# Patient Record
Sex: FEMALE | Race: BLACK OR AFRICAN AMERICAN | NOT HISPANIC OR LATINO
[De-identification: names, ages, dates, MRNs, and addresses within clinical notes are randomized per-mention and may not be internally consistent; named-entity substitution may affect disease eponyms.]

---

## 2017-05-02 ENCOUNTER — MEDICATION RENEWAL (OUTPATIENT)
Age: 72
End: 2017-05-02

## 2017-05-08 ENCOUNTER — MEDICATION RENEWAL (OUTPATIENT)
Age: 72
End: 2017-05-08

## 2017-05-24 ENCOUNTER — APPOINTMENT (OUTPATIENT)
Dept: INTERNAL MEDICINE | Facility: CLINIC | Age: 72
End: 2017-05-24

## 2017-05-24 ENCOUNTER — NON-APPOINTMENT (OUTPATIENT)
Age: 72
End: 2017-05-24

## 2017-05-24 ENCOUNTER — LABORATORY RESULT (OUTPATIENT)
Age: 72
End: 2017-05-24

## 2017-05-24 VITALS
BODY MASS INDEX: 35.97 KG/M2 | HEART RATE: 78 BPM | DIASTOLIC BLOOD PRESSURE: 74 MMHG | OXYGEN SATURATION: 98 % | HEIGHT: 63 IN | WEIGHT: 203 LBS | SYSTOLIC BLOOD PRESSURE: 130 MMHG

## 2017-05-24 DIAGNOSIS — Z98.890 OTHER SPECIFIED POSTPROCEDURAL STATES: ICD-10-CM

## 2017-05-24 DIAGNOSIS — Z12.4 ENCOUNTER FOR SCREENING FOR MALIGNANT NEOPLASM OF CERVIX: ICD-10-CM

## 2017-05-24 DIAGNOSIS — Z87.891 PERSONAL HISTORY OF NICOTINE DEPENDENCE: ICD-10-CM

## 2017-05-24 DIAGNOSIS — Z92.89 PERSONAL HISTORY OF OTHER MEDICAL TREATMENT: ICD-10-CM

## 2017-06-01 LAB
25(OH)D3 SERPL-MCNC: 35.8 NG/ML
ALBUMIN SERPL ELPH-MCNC: 4 G/DL
ALP BLD-CCNC: 66 U/L
ALT SERPL-CCNC: 13 U/L
ANION GAP SERPL CALC-SCNC: 15 MMOL/L
APPEARANCE: CLEAR
AST SERPL-CCNC: 23 U/L
BASOPHILS # BLD AUTO: 0.02 K/UL
BASOPHILS NFR BLD AUTO: 0.6 %
BILIRUB SERPL-MCNC: 0.4 MG/DL
BILIRUBIN URINE: NEGATIVE
BLOOD URINE: NEGATIVE
BUN SERPL-MCNC: 18 MG/DL
CALCIUM SERPL-MCNC: 10 MG/DL
CHLORIDE SERPL-SCNC: 102 MMOL/L
CHOLEST SERPL-MCNC: 227 MG/DL
CHOLEST/HDLC SERPL: 2.7 RATIO
CO2 SERPL-SCNC: 24 MMOL/L
COLOR: YELLOW
CREAT SERPL-MCNC: 0.84 MG/DL
CREAT SPEC-SCNC: 78 MG/DL
EOSINOPHIL # BLD AUTO: 0.1 K/UL
EOSINOPHIL NFR BLD AUTO: 2.9 %
GLUCOSE QUALITATIVE U: NORMAL MG/DL
GLUCOSE SERPL-MCNC: 103 MG/DL
HBA1C MFR BLD HPLC: 6.1 %
HCT VFR BLD CALC: 37.2 %
HCV AB SER QL: NONREACTIVE
HCV S/CO RATIO: 0.09 S/CO
HDLC SERPL-MCNC: 85 MG/DL
HGB BLD-MCNC: 11.8 G/DL
IMM GRANULOCYTES NFR BLD AUTO: 0 %
KETONES URINE: NEGATIVE
LDLC SERPL CALC-MCNC: 130 MG/DL
LEUKOCYTE ESTERASE URINE: ABNORMAL
LYMPHOCYTES # BLD AUTO: 0.86 K/UL
LYMPHOCYTES NFR BLD AUTO: 24.6 %
MAN DIFF?: NORMAL
MCHC RBC-ENTMCNC: 31.6 PG
MCHC RBC-ENTMCNC: 31.7 GM/DL
MCV RBC AUTO: 99.5 FL
MICROALBUMIN 24H UR DL<=1MG/L-MCNC: 1.1 MG/DL
MICROALBUMIN/CREAT 24H UR-RTO: 14
MONOCYTES # BLD AUTO: 0.24 K/UL
MONOCYTES NFR BLD AUTO: 6.9 %
NEUTROPHILS # BLD AUTO: 2.27 K/UL
NEUTROPHILS NFR BLD AUTO: 65 %
NITRITE URINE: NEGATIVE
PH URINE: 6
PLATELET # BLD AUTO: 260 K/UL
POTASSIUM SERPL-SCNC: 3.8 MMOL/L
PROT SERPL-MCNC: 7.1 G/DL
PROTEIN URINE: NEGATIVE MG/DL
RBC # BLD: 3.74 M/UL
RBC # FLD: 13.5 %
SODIUM SERPL-SCNC: 141 MMOL/L
SPECIFIC GRAVITY URINE: 1.02
T4 FREE SERPL-MCNC: 1.2 NG/DL
TRIGL SERPL-MCNC: 61 MG/DL
TSH SERPL-ACNC: 1.51 UIU/ML
UROBILINOGEN URINE: NORMAL MG/DL
WBC # FLD AUTO: 3.49 K/UL

## 2017-10-02 ENCOUNTER — MEDICATION RENEWAL (OUTPATIENT)
Age: 72
End: 2017-10-02

## 2018-01-29 DIAGNOSIS — Z92.89 PERSONAL HISTORY OF OTHER MEDICAL TREATMENT: ICD-10-CM

## 2018-01-31 PROBLEM — Z92.89 H/O MAMMOGRAM: Status: RESOLVED | Noted: 2017-05-24 | Resolved: 2018-01-31

## 2018-02-05 ENCOUNTER — MEDICATION RENEWAL (OUTPATIENT)
Age: 73
End: 2018-02-05

## 2018-02-06 ENCOUNTER — MEDICATION RENEWAL (OUTPATIENT)
Age: 73
End: 2018-02-06

## 2018-06-25 ENCOUNTER — APPOINTMENT (OUTPATIENT)
Dept: INTERNAL MEDICINE | Facility: CLINIC | Age: 73
End: 2018-06-25
Payer: COMMERCIAL

## 2018-06-25 VITALS
HEART RATE: 73 BPM | OXYGEN SATURATION: 98 % | SYSTOLIC BLOOD PRESSURE: 130 MMHG | DIASTOLIC BLOOD PRESSURE: 80 MMHG | WEIGHT: 196 LBS | BODY MASS INDEX: 34.72 KG/M2 | TEMPERATURE: 97.5 F

## 2018-06-25 PROCEDURE — 99214 OFFICE O/P EST MOD 30 MIN: CPT | Mod: 25

## 2018-06-25 PROCEDURE — 36415 COLL VENOUS BLD VENIPUNCTURE: CPT

## 2018-06-25 NOTE — PHYSICAL EXAM
[No Acute Distress] : no acute distress [Well Nourished] : well nourished [Well Developed] : well developed [Well-Appearing] : well-appearing [No Respiratory Distress] : no respiratory distress  [Clear to Auscultation] : lungs were clear to auscultation bilaterally [No Accessory Muscle Use] : no accessory muscle use [Normal Rate] : normal rate  [Regular Rhythm] : with a regular rhythm [Normal S1, S2] : normal S1 and S2 [No Murmur] : no murmur heard [No Rash] : no rash [Normal Gait] : normal gait [Normal Affect] : the affect was normal [Alert and Oriented x3] : oriented to person, place, and time [Normal Mood] : the mood was normal [Normal Insight/Judgement] : insight and judgment were intact

## 2018-06-25 NOTE — HISTORY OF PRESENT ILLNESS
[FreeTextEntry1] : follow up [de-identified] : PreDM2\par - compliant w/ metformin\par - no hypoglycemic sx\par \par Sarcoid\par - breathing stable, no cough \par - intermittently has to take deep breath\par \par

## 2018-06-27 LAB
25(OH)D3 SERPL-MCNC: 37.2 NG/ML
ALBUMIN SERPL ELPH-MCNC: 4 G/DL
ALP BLD-CCNC: 69 U/L
ALT SERPL-CCNC: 10 U/L
ANION GAP SERPL CALC-SCNC: 14 MMOL/L
AST SERPL-CCNC: 19 U/L
BASOPHILS # BLD AUTO: 0.04 K/UL
BASOPHILS NFR BLD AUTO: 1 %
BILIRUB SERPL-MCNC: 0.3 MG/DL
BUN SERPL-MCNC: 17 MG/DL
CALCIUM SERPL-MCNC: 9.7 MG/DL
CHLORIDE SERPL-SCNC: 101 MMOL/L
CO2 SERPL-SCNC: 27 MMOL/L
CREAT SERPL-MCNC: 0.72 MG/DL
EOSINOPHIL # BLD AUTO: 0.1 K/UL
EOSINOPHIL NFR BLD AUTO: 2.4 %
GLUCOSE SERPL-MCNC: 103 MG/DL
HBA1C MFR BLD HPLC: 6 %
HCT VFR BLD CALC: 40.3 %
HGB BLD-MCNC: 12.7 G/DL
IMM GRANULOCYTES NFR BLD AUTO: 0 %
LYMPHOCYTES # BLD AUTO: 0.97 K/UL
LYMPHOCYTES NFR BLD AUTO: 23.7 %
MAN DIFF?: NORMAL
MCHC RBC-ENTMCNC: 31.5 GM/DL
MCHC RBC-ENTMCNC: 32.2 PG
MCV RBC AUTO: 102 FL
MONOCYTES # BLD AUTO: 0.29 K/UL
MONOCYTES NFR BLD AUTO: 7.1 %
NEUTROPHILS # BLD AUTO: 2.69 K/UL
NEUTROPHILS NFR BLD AUTO: 65.8 %
PLATELET # BLD AUTO: 253 K/UL
POTASSIUM SERPL-SCNC: 4.2 MMOL/L
PROT SERPL-MCNC: 7.3 G/DL
RBC # BLD: 3.95 M/UL
RBC # FLD: 13.5 %
SODIUM SERPL-SCNC: 142 MMOL/L
T4 FREE SERPL-MCNC: 1.2 NG/DL
TSH SERPL-ACNC: 1.35 UIU/ML
WBC # FLD AUTO: 4.09 K/UL

## 2018-11-26 ENCOUNTER — APPOINTMENT (OUTPATIENT)
Dept: INTERNAL MEDICINE | Facility: CLINIC | Age: 73
End: 2018-11-26
Payer: COMMERCIAL

## 2018-11-26 VITALS
HEART RATE: 80 BPM | WEIGHT: 202 LBS | TEMPERATURE: 98.1 F | OXYGEN SATURATION: 98 % | BODY MASS INDEX: 35.79 KG/M2 | DIASTOLIC BLOOD PRESSURE: 84 MMHG | HEIGHT: 63 IN | SYSTOLIC BLOOD PRESSURE: 140 MMHG

## 2018-11-26 DIAGNOSIS — R42 DIZZINESS AND GIDDINESS: ICD-10-CM

## 2018-11-26 PROCEDURE — 99214 OFFICE O/P EST MOD 30 MIN: CPT | Mod: 25

## 2018-11-26 PROCEDURE — 36415 COLL VENOUS BLD VENIPUNCTURE: CPT

## 2018-11-26 NOTE — HISTORY OF PRESENT ILLNESS
[FreeTextEntry8] : left lateral calf pain\par - started 2 days ago, abruptly. woke her up from sleep\par - worse w/ movement\par - started advil which helped mildly\par - no LE edema, no warmth or redness \par - pt concerned for blood clot.\par - denies h/o gout.

## 2018-11-26 NOTE — PHYSICAL EXAM
[No Acute Distress] : no acute distress [Well Nourished] : well nourished [Well Developed] : well developed [Well-Appearing] : well-appearing [Normal Sclera/Conjunctiva] : normal sclera/conjunctiva [No Respiratory Distress] : no respiratory distress  [Clear to Auscultation] : lungs were clear to auscultation bilaterally [No Accessory Muscle Use] : no accessory muscle use [No Edema] : there was no peripheral edema [No Rash] : no rash [Normal Gait] : normal gait [Normal Affect] : the affect was normal [Alert and Oriented x3] : oriented to person, place, and time [Normal Mood] : the mood was normal [Normal Insight/Judgement] : insight and judgment were intact [de-identified] : left lateral calf tenderness to deep palpation, no pain w/ dorsiflexion.

## 2018-11-28 DIAGNOSIS — G54.9 NERVE ROOT AND PLEXUS DISORDER, UNSPECIFIED: ICD-10-CM

## 2018-11-28 LAB
25(OH)D3 SERPL-MCNC: 17 NG/ML
ALBUMIN SERPL ELPH-MCNC: 4.3 G/DL
ALP BLD-CCNC: 71 U/L
ALT SERPL-CCNC: 12 U/L
ANION GAP SERPL CALC-SCNC: 15 MMOL/L
AST SERPL-CCNC: 21 U/L
BASOPHILS # BLD AUTO: 0.02 K/UL
BASOPHILS NFR BLD AUTO: 0.5 %
BILIRUB SERPL-MCNC: 0.4 MG/DL
BUN SERPL-MCNC: 17 MG/DL
CALCIUM SERPL-MCNC: 9.5 MG/DL
CHLORIDE SERPL-SCNC: 103 MMOL/L
CHOLEST SERPL-MCNC: 222 MG/DL
CHOLEST/HDLC SERPL: 2.6 RATIO
CO2 SERPL-SCNC: 24 MMOL/L
CREAT SERPL-MCNC: 0.76 MG/DL
DEPRECATED D DIMER PPP IA-ACNC: 311 NG/ML DDU
EOSINOPHIL # BLD AUTO: 0.06 K/UL
EOSINOPHIL NFR BLD AUTO: 1.6 %
GLUCOSE SERPL-MCNC: 83 MG/DL
HBA1C MFR BLD HPLC: 5.7 %
HCT VFR BLD CALC: 36.7 %
HDLC SERPL-MCNC: 85 MG/DL
HGB BLD-MCNC: 12.4 G/DL
IMM GRANULOCYTES NFR BLD AUTO: 0 %
LDLC SERPL CALC-MCNC: 128 MG/DL
LYMPHOCYTES # BLD AUTO: 0.94 K/UL
LYMPHOCYTES NFR BLD AUTO: 25.8 %
MAN DIFF?: NORMAL
MCHC RBC-ENTMCNC: 33.1 PG
MCHC RBC-ENTMCNC: 33.8 GM/DL
MCV RBC AUTO: 97.9 FL
MONOCYTES # BLD AUTO: 0.22 K/UL
MONOCYTES NFR BLD AUTO: 6 %
NEUTROPHILS # BLD AUTO: 2.41 K/UL
NEUTROPHILS NFR BLD AUTO: 66.1 %
PLATELET # BLD AUTO: 226 K/UL
POTASSIUM SERPL-SCNC: 3.5 MMOL/L
PROT SERPL-MCNC: 7.1 G/DL
RBC # BLD: 3.75 M/UL
RBC # FLD: 13.4 %
SODIUM SERPL-SCNC: 142 MMOL/L
TRIGL SERPL-MCNC: 46 MG/DL
URATE SERPL-MCNC: 4.6 MG/DL
WBC # FLD AUTO: 3.65 K/UL

## 2018-11-29 LAB — ACE BLD-CCNC: 58 U/L

## 2019-05-13 ENCOUNTER — MEDICATION RENEWAL (OUTPATIENT)
Age: 74
End: 2019-05-13

## 2019-08-29 ENCOUNTER — EMERGENCY (EMERGENCY)
Facility: HOSPITAL | Age: 74
LOS: 1 days | Discharge: HOME | End: 2019-08-29
Attending: EMERGENCY MEDICINE | Admitting: EMERGENCY MEDICINE
Payer: COMMERCIAL

## 2019-08-29 VITALS
WEIGHT: 205.03 LBS | DIASTOLIC BLOOD PRESSURE: 107 MMHG | HEIGHT: 65 IN | OXYGEN SATURATION: 99 % | RESPIRATION RATE: 18 BRPM | HEART RATE: 81 BPM | TEMPERATURE: 98 F | SYSTOLIC BLOOD PRESSURE: 177 MMHG

## 2019-08-29 DIAGNOSIS — R51 HEADACHE: ICD-10-CM

## 2019-08-29 LAB
ANION GAP SERPL CALC-SCNC: 11 MMOL/L — SIGNIFICANT CHANGE UP (ref 5–17)
BASOPHILS # BLD AUTO: 0.06 K/UL — SIGNIFICANT CHANGE UP (ref 0–0.2)
BASOPHILS NFR BLD AUTO: 1 % — SIGNIFICANT CHANGE UP (ref 0–2)
BUN SERPL-MCNC: 14 MG/DL — SIGNIFICANT CHANGE UP (ref 7–23)
CALCIUM SERPL-MCNC: 9.6 MG/DL — SIGNIFICANT CHANGE UP (ref 8.4–10.5)
CHLORIDE SERPL-SCNC: 101 MMOL/L — SIGNIFICANT CHANGE UP (ref 96–108)
CO2 SERPL-SCNC: 29 MMOL/L — SIGNIFICANT CHANGE UP (ref 22–31)
CREAT SERPL-MCNC: 0.79 MG/DL — SIGNIFICANT CHANGE UP (ref 0.5–1.3)
EOSINOPHIL # BLD AUTO: 0.06 K/UL — SIGNIFICANT CHANGE UP (ref 0–0.5)
EOSINOPHIL NFR BLD AUTO: 1 % — SIGNIFICANT CHANGE UP (ref 0–6)
EXTRA BLUE TOP TUBE: SIGNIFICANT CHANGE UP
GLUCOSE SERPL-MCNC: 126 MG/DL — HIGH (ref 70–99)
HCT VFR BLD CALC: 37 % — SIGNIFICANT CHANGE UP (ref 34.5–45)
HGB BLD-MCNC: 12.2 G/DL — SIGNIFICANT CHANGE UP (ref 11.5–15.5)
IMM GRANULOCYTES NFR BLD AUTO: 0.3 % — SIGNIFICANT CHANGE UP (ref 0–1.5)
LYMPHOCYTES # BLD AUTO: 0.96 K/UL — LOW (ref 1–3.3)
LYMPHOCYTES # BLD AUTO: 15.3 % — SIGNIFICANT CHANGE UP (ref 13–44)
MCHC RBC-ENTMCNC: 32.2 PG — SIGNIFICANT CHANGE UP (ref 27–34)
MCHC RBC-ENTMCNC: 33 GM/DL — SIGNIFICANT CHANGE UP (ref 32–36)
MCV RBC AUTO: 97.6 FL — SIGNIFICANT CHANGE UP (ref 80–100)
MONOCYTES # BLD AUTO: 0.39 K/UL — SIGNIFICANT CHANGE UP (ref 0–0.9)
MONOCYTES NFR BLD AUTO: 6.2 % — SIGNIFICANT CHANGE UP (ref 2–14)
NEUTROPHILS # BLD AUTO: 4.78 K/UL — SIGNIFICANT CHANGE UP (ref 1.8–7.4)
NEUTROPHILS NFR BLD AUTO: 76.2 % — SIGNIFICANT CHANGE UP (ref 43–77)
NRBC # BLD: 0 /100 WBCS — SIGNIFICANT CHANGE UP (ref 0–0)
PLATELET # BLD AUTO: 247 K/UL — SIGNIFICANT CHANGE UP (ref 150–400)
POTASSIUM SERPL-MCNC: SIGNIFICANT CHANGE UP MMOL/L (ref 3.5–5.3)
POTASSIUM SERPL-SCNC: SIGNIFICANT CHANGE UP MMOL/L (ref 3.5–5.3)
RBC # BLD: 3.79 M/UL — LOW (ref 3.8–5.2)
RBC # FLD: 12.8 % — SIGNIFICANT CHANGE UP (ref 10.3–14.5)
SODIUM SERPL-SCNC: 141 MMOL/L — SIGNIFICANT CHANGE UP (ref 135–145)
WBC # BLD: 6.27 K/UL — SIGNIFICANT CHANGE UP (ref 3.8–10.5)
WBC # FLD AUTO: 6.27 K/UL — SIGNIFICANT CHANGE UP (ref 3.8–10.5)

## 2019-08-29 PROCEDURE — 70496 CT ANGIOGRAPHY HEAD: CPT

## 2019-08-29 PROCEDURE — 70498 CT ANGIOGRAPHY NECK: CPT | Mod: 26

## 2019-08-29 PROCEDURE — 93005 ELECTROCARDIOGRAM TRACING: CPT

## 2019-08-29 PROCEDURE — 93010 ELECTROCARDIOGRAM REPORT: CPT | Mod: 59

## 2019-08-29 PROCEDURE — 99284 EMERGENCY DEPT VISIT MOD MDM: CPT

## 2019-08-29 PROCEDURE — 70450 CT HEAD/BRAIN W/O DYE: CPT

## 2019-08-29 PROCEDURE — 96367 TX/PROPH/DG ADDL SEQ IV INF: CPT | Mod: XU

## 2019-08-29 PROCEDURE — 36415 COLL VENOUS BLD VENIPUNCTURE: CPT

## 2019-08-29 PROCEDURE — 85025 COMPLETE CBC W/AUTO DIFF WBC: CPT

## 2019-08-29 PROCEDURE — 80048 BASIC METABOLIC PNL TOTAL CA: CPT

## 2019-08-29 PROCEDURE — 70450 CT HEAD/BRAIN W/O DYE: CPT | Mod: 26,59

## 2019-08-29 PROCEDURE — 70498 CT ANGIOGRAPHY NECK: CPT

## 2019-08-29 PROCEDURE — 70496 CT ANGIOGRAPHY HEAD: CPT | Mod: 26

## 2019-08-29 PROCEDURE — 96365 THER/PROPH/DIAG IV INF INIT: CPT | Mod: XU

## 2019-08-29 PROCEDURE — 96375 TX/PRO/DX INJ NEW DRUG ADDON: CPT | Mod: XU

## 2019-08-29 PROCEDURE — 99284 EMERGENCY DEPT VISIT MOD MDM: CPT | Mod: 25

## 2019-08-29 RX ORDER — DIPHENHYDRAMINE HCL 50 MG
25 CAPSULE ORAL ONCE
Refills: 0 | Status: COMPLETED | OUTPATIENT
Start: 2019-08-29 | End: 2019-08-29

## 2019-08-29 RX ORDER — ACETAMINOPHEN 500 MG
1000 TABLET ORAL ONCE
Refills: 0 | Status: COMPLETED | OUTPATIENT
Start: 2019-08-29 | End: 2019-08-29

## 2019-08-29 RX ORDER — METOCLOPRAMIDE HCL 10 MG
10 TABLET ORAL ONCE
Refills: 0 | Status: COMPLETED | OUTPATIENT
Start: 2019-08-29 | End: 2019-08-29

## 2019-08-29 RX ADMIN — Medication 10 MILLIGRAM(S): at 22:00

## 2019-08-29 RX ADMIN — Medication 1000 MILLIGRAM(S): at 23:11

## 2019-08-29 RX ADMIN — Medication 104 MILLIGRAM(S): at 21:29

## 2019-08-29 RX ADMIN — Medication 25 MILLIGRAM(S): at 21:05

## 2019-08-29 RX ADMIN — Medication 400 MILLIGRAM(S): at 22:50

## 2019-08-29 NOTE — ED PROVIDER NOTE - PATIENT PORTAL LINK FT
You can access the FollowMyHealth Patient Portal offered by [facility] by registering at the following website: [email or web address]. By joining [facility]’s FollowMyHealth portal, you will also be able to view your health information using other applications (apps) compatible with our system.

## 2019-08-29 NOTE — ED PROVIDER NOTE - OBJECTIVE STATEMENT
Pt w/ PMHx HTN (HCTZ 25 mg), DM, Vertigo, p/w HA. The HA's have been intermittent x 3 days. Today's HA began at 6 pm. The pain today is R frontal, throbbing. Pain was 10/10, now 4/10 s/p taking Tylenol at 6:15pm. No associated vision changes, neck pain / stiffness, dizziness, n/v, numbness /tingling, weakness, f/c, nor other complaints. The previous HA's were rated 5-6/10. No recent head trauma, prolonged neck extension. No AC use.

## 2019-08-29 NOTE — ED PROVIDER NOTE - CLINICAL SUMMARY MEDICAL DECISION MAKING FREE TEXT BOX
Pt p/w HA, onset 3 hours PTA. HA already sig improved w/ oral Tylenol. No focal neuro complaints or deficits. Given acute presentation, CT sensitivity for SAH in Pt p/w HA, onset 3 hours PTA. HA already sig improved w/ oral Tylenol. No focal neuro complaints or deficits. Given acute presentation, CT sensitivity for SAH in this setting very good. Will also get CTA. DDx includes but not limited HTNsive urgency / emergency, migraine, tension, SAH, other pathology. Check labs, EKG, CTH/CTA, analgesia. Dispo pending w/u and clinical status

## 2019-08-29 NOTE — ED PROVIDER NOTE - NSFOLLOWUPINSTRUCTIONS_ED_ALL_ED_FT
Please take a daily baby aspirin to help thin your blood.    You CT shows some calcifications in your vertebral artery and carotid artery. You will need to follow up with vascular for an ultrasound of our carotids. There are also some lymph nodes around your heart, it is important that this is followed by your primary doctor to make sure they do not get bigger or change. Your thyroid gland is also mildly enlarged and you will need an ultrasound as an outpatient.     Headache    A headache is pain or discomfort felt around the head or neck area. The specific cause of a headache may not be found as there are many types including tension headaches, migraine headaches, and cluster headaches. Watch your condition for any changes. Things you can do to manage your pain include taking over the counter and prescription medications as instructed by your health care provider, lying down in a dark quiet room, limiting stress, getting regular sleep, and refraining from alcohol and tobacco products.    SEEK IMMEDIATE MEDICAL CARE IF YOU HAVE ANY OF THE FOLLOWING SYMPTOMS: fever, vomiting, stiff neck, loss of vision, problems with speech, muscle weakness, loss of balance, trouble walking, passing out, or confusion.

## 2019-08-29 NOTE — ED ADULT NURSE NOTE - CHPI ED NUR SYMPTOMS NEG
no blurred vision/no confusion/no vomiting/no dizziness/no fever/no loss of consciousness/no weakness/no change in level of consciousness/no nausea/no numbness

## 2019-08-29 NOTE — ED PROVIDER NOTE - CARE PROVIDER_API CALL
Benton Wylie)  Surgery; Vascular Surgery  [address], [place] [address]  Phone: [phone]  Fax: [phone]  Follow Up Time:

## 2019-08-29 NOTE — ED PROVIDER NOTE - PROGRESS NOTE DETAILS
RODRIGUEZ 2/10 HA 1/10. Requesting d/c. Pending results. CTA shows Suspected moderate to severe stenosis of the origin of the left vertebral artery secondary to calcified plaque with normal distal opacification and caliber; Mediastinal and hilar lymphadenopathy of uncertain etiology; Mildly heterogeneous enlarged right lobe of the thyroid.  Pt made aware of findings. Seen by vascular recommended daily baby asa and f/u as outpatient

## 2019-08-29 NOTE — ED ADULT TRIAGE NOTE - CHIEF COMPLAINT QUOTE
headache with nausea and vomiting x1 day; elevated BP; sent by urgent care; denies numbness/weakness to any part of body

## 2019-08-29 NOTE — ED ADULT NURSE NOTE - OBJECTIVE STATEMENT
Received a 74 year old female with a chief complain of severe headache. Patient was evaluated an outpatient in an Urgent care center and sent to the ED for a report of "the worst headache" and reported hypertension.    Patient observed to be able to ambulate from stretcher to rney.

## 2019-08-29 NOTE — ED PROVIDER NOTE - PHYSICAL EXAMINATION
Constitutional: Well appearing, well nourished, awake, alert, oriented to person, place, time/situation and in no apparent distress.  ENMT: Airway patent. Normal MM  Eyes: Clear bilaterally, PERRL, EOMI  Cardiac: Normal rate, regular rhythm.  Heart sounds S1, S2.  No murmurs, rubs or gallops.  Respiratory: Breaths sounds equal and clear b/l. No increased WOB, tachypnea, hypoxia, or accessory mm use. Pt speaks in full sentences.   Gastrointestinal: Abd soft, NT, ND, NABS. No guarding, rebound, or rigidity. No pulsatile abdominal masses. No organomegaly appreciated. No CVAT   Musculoskeletal: Range of motion is not limited  Neuro: Alert & Oriented x 3. CN II-XII intact. No facial droop. Clear speech. PAUL w/ 5/5 strength x 4 ext. Normal sensation. No pronator drift. No dysdidokinesia nor dysmetria. Normal heel-to-shin.   Skin: Skin normal color for race, warm, dry and intact. No evidence of rash.  Psych: Alert and oriented to person, place, time/situation. normal mood and affect. no apparent risk to self or others.

## 2019-08-30 VITALS
SYSTOLIC BLOOD PRESSURE: 154 MMHG | DIASTOLIC BLOOD PRESSURE: 88 MMHG | HEART RATE: 84 BPM | RESPIRATION RATE: 18 BRPM | OXYGEN SATURATION: 98 %

## 2019-08-30 NOTE — CONSULT NOTE ADULT - ASSESSMENT
Pt is 74y.o w/ Hx HTN, DM, Vertigo who presents to ED c/o headache. Pt found to have L vertebral artery stenosis & calcifications of the bilateral cavernous ICAs without high-grade stenosis.    - No acute Vascular intervention  -Recommend f/u with Dr Wylie in office for Carotid Duplex

## 2019-08-30 NOTE — CONSULT NOTE ADULT - SUBJECTIVE AND OBJECTIVE BOX
Vascular Attending:  Inessa    HPI:  Pt is 74y.o w/ Hx HTN, DM, Vertigo who presents to ED c/o headache. The HA's have been intermittent x 3 days., but today's HA began at 6 pm and was 10/10. R frontal, throbbing. Pain  is now 4/10.  No associated vision changes, neck pain, stiffness, dizziness, n/v, numbness /tingling, weakness, f/c, nor other complaints.  No recent head trauma or injury.      PAST MEDICAL & SURGICAL HISTORY:  Hypertension      REVIEW OF SYSTEMS  Neg except as in HPI    Allergies  No Known Allergies  Intolerances    Vital Signs Last 24 Hrs  T(C): 36.4 (29 Aug 2019 22:14), Max: 36.5 (29 Aug 2019 20:44)  T(F): 97.6 (29 Aug 2019 22:14), Max: 97.7 (29 Aug 2019 20:44)  HR: 84 (30 Aug 2019 02:00) (74 - 84)  BP: 154/88 (30 Aug 2019 02:00) (154/88 - 177/107)  BP(mean): --  RR: 18 (30 Aug 2019 02:00) (16 - 18)  SpO2: 98% (30 Aug 2019 02:00) (96% - 99%)    PHYSICAL EXAM:    Constitutional: PT AXOX3 in NAD  Eyes: PERRL  Respiratory: Unlabored  Cardiovascular: S1S2  Extremities: no edema or erythema, warm to touch  Vascular: palpable throughout  Neurological: intact    LABS:                        12.2   6.27  )-----------( 247      ( 29 Aug 2019 21:01 )             37.0     08-29    141  |  101  |  14  ----------------------------<  126<H>  see note   |  29  |  0.79    Ca    9.6      29 Aug 2019 21:01            RADIOLOGY & ADDITIONAL STUDIES

## 2019-09-03 ENCOUNTER — APPOINTMENT (OUTPATIENT)
Dept: VASCULAR SURGERY | Facility: CLINIC | Age: 74
End: 2019-09-03
Payer: COMMERCIAL

## 2019-09-03 DIAGNOSIS — G44.001 CLUSTER HEADACHE SYNDROME, UNSPECIFIED, INTRACTABLE: ICD-10-CM

## 2019-09-03 PROBLEM — I10 ESSENTIAL (PRIMARY) HYPERTENSION: Chronic | Status: ACTIVE | Noted: 2019-08-29

## 2019-09-03 PROCEDURE — 93880 EXTRACRANIAL BILAT STUDY: CPT

## 2019-09-03 PROCEDURE — 99204 OFFICE O/P NEW MOD 45 MIN: CPT

## 2019-09-05 ENCOUNTER — LABORATORY RESULT (OUTPATIENT)
Age: 74
End: 2019-09-05

## 2019-09-05 ENCOUNTER — APPOINTMENT (OUTPATIENT)
Dept: INTERNAL MEDICINE | Facility: CLINIC | Age: 74
End: 2019-09-05
Payer: COMMERCIAL

## 2019-09-05 VITALS
BODY MASS INDEX: 35.79 KG/M2 | DIASTOLIC BLOOD PRESSURE: 79 MMHG | WEIGHT: 202 LBS | TEMPERATURE: 98.6 F | HEIGHT: 63 IN | OXYGEN SATURATION: 95 % | HEART RATE: 92 BPM | SYSTOLIC BLOOD PRESSURE: 129 MMHG

## 2019-09-05 PROCEDURE — 99214 OFFICE O/P EST MOD 30 MIN: CPT | Mod: 25

## 2019-09-05 PROCEDURE — 36415 COLL VENOUS BLD VENIPUNCTURE: CPT

## 2019-09-05 RX ORDER — PREDNISONE 20 MG/1
20 TABLET ORAL DAILY
Qty: 10 | Refills: 0 | Status: DISCONTINUED | COMMUNITY
Start: 2018-11-28 | End: 2019-09-05

## 2019-09-05 RX ORDER — MELOXICAM 7.5 MG/1
7.5 TABLET ORAL DAILY
Qty: 14 | Refills: 0 | Status: DISCONTINUED | COMMUNITY
Start: 2018-11-28 | End: 2019-09-05

## 2019-09-05 NOTE — PHYSICAL EXAM
[No Acute Distress] : no acute distress [Well Nourished] : well nourished [Well Developed] : well developed [Well-Appearing] : well-appearing [No Respiratory Distress] : no respiratory distress  [No Accessory Muscle Use] : no accessory muscle use [Clear to Auscultation] : lungs were clear to auscultation bilaterally [Normal Rate] : normal rate  [Regular Rhythm] : with a regular rhythm [Normal S1, S2] : normal S1 and S2 [No Murmur] : no murmur heard

## 2019-09-05 NOTE — HISTORY OF PRESENT ILLNESS
[FreeTextEntry1] : ED f/u [de-identified] : ED Visit Date: 8/29/19\par Facility: [facility]\par Dx: headache\par \par HPI: \par - developed acute onset severe HA 10/10\par - went to urgent care who referred her to [facility] ED\par - in ED CT head and neck showed L vertebral artery stenosis\par - after HA improved pt d/c'ed home w/ vascular surgery f/u\par - in office carotid duplex by vascular showed no ICA stenosis\par - pt HA have since improved\par

## 2019-09-06 LAB
25(OH)D3 SERPL-MCNC: 39.9 NG/ML
ALBUMIN SERPL ELPH-MCNC: 4.5 G/DL
ALP BLD-CCNC: 67 U/L
ALT SERPL-CCNC: 11 U/L
ANION GAP SERPL CALC-SCNC: 15 MMOL/L
APPEARANCE: CLEAR
AST SERPL-CCNC: 18 U/L
BASOPHILS # BLD AUTO: 0.05 K/UL
BASOPHILS NFR BLD AUTO: 1.4 %
BILIRUB SERPL-MCNC: 0.4 MG/DL
BILIRUBIN URINE: NEGATIVE
BLOOD URINE: NORMAL
BUN SERPL-MCNC: 15 MG/DL
CALCIUM SERPL-MCNC: 10.5 MG/DL
CHLORIDE SERPL-SCNC: 100 MMOL/L
CHOLEST SERPL-MCNC: 251 MG/DL
CHOLEST/HDLC SERPL: 3.3 RATIO
CO2 SERPL-SCNC: 26 MMOL/L
COLOR: NORMAL
CREAT SERPL-MCNC: 0.91 MG/DL
CREAT SPEC-SCNC: 156 MG/DL
EOSINOPHIL # BLD AUTO: 0.08 K/UL
EOSINOPHIL NFR BLD AUTO: 2.2 %
ESTIMATED AVERAGE GLUCOSE: 120 MG/DL
GLUCOSE QUALITATIVE U: NEGATIVE
GLUCOSE SERPL-MCNC: 98 MG/DL
HBA1C MFR BLD HPLC: 5.8 %
HCT VFR BLD CALC: 39 %
HDLC SERPL-MCNC: 76 MG/DL
HGB BLD-MCNC: 12.6 G/DL
IMM GRANULOCYTES NFR BLD AUTO: 0.3 %
KETONES URINE: NORMAL
LDLC SERPL CALC-MCNC: 159 MG/DL
LEUKOCYTE ESTERASE URINE: ABNORMAL
LYMPHOCYTES # BLD AUTO: 0.96 K/UL
LYMPHOCYTES NFR BLD AUTO: 26 %
MAN DIFF?: NORMAL
MCHC RBC-ENTMCNC: 32.3 GM/DL
MCHC RBC-ENTMCNC: 32.6 PG
MCV RBC AUTO: 101 FL
MICROALBUMIN 24H UR DL<=1MG/L-MCNC: 1.6 MG/DL
MICROALBUMIN/CREAT 24H UR-RTO: 10 MG/G
MONOCYTES # BLD AUTO: 0.26 K/UL
MONOCYTES NFR BLD AUTO: 7 %
NEUTROPHILS # BLD AUTO: 2.33 K/UL
NEUTROPHILS NFR BLD AUTO: 63.1 %
NITRITE URINE: NEGATIVE
PH URINE: 6.5
PLATELET # BLD AUTO: 240 K/UL
POTASSIUM SERPL-SCNC: 3.9 MMOL/L
PROT SERPL-MCNC: 7.3 G/DL
PROTEIN URINE: NEGATIVE
RBC # BLD: 3.86 M/UL
RBC # FLD: 13 %
SODIUM SERPL-SCNC: 141 MMOL/L
SPECIFIC GRAVITY URINE: 1.01
TRIGL SERPL-MCNC: 82 MG/DL
UROBILINOGEN URINE: NORMAL
WBC # FLD AUTO: 3.69 K/UL

## 2019-09-19 NOTE — REASON FOR VISIT
[Consultation] : a consultation visit [Formal Caregiver] : formal caregiver [FreeTextEntry1] : Isolated episode of frontal lobe headache, now resolved but w/residual hypersensitivity

## 2019-09-19 NOTE — PHYSICAL EXAM
[Normal Thyroid] : the thyroid was normal [Normal Breath Sounds] : Normal breath sounds [Normal Heart Sounds] : normal heart sounds [Normal Rate and Rhythm] : normal rate and rhythm [2+] : left 2+ [No Rash or Lesion] : No rash or lesion [Alert] : alert [Calm] : calm [JVD] : no jugular venous distention  [Carotid Bruits] : no carotid bruits [Right Carotid Bruit] : no bruit heard over the right carotid [Left Carotid Bruit] : no bruit heard over the left carotid [Ankle Swelling (On Exam)] : not present [Varicose Veins Of Lower Extremities] : not present [] : not present [de-identified] : Well-nourished, NAD [de-identified] : NC/AT, oscarictsusy, EOMIx6 [de-identified] : FROM throughout, strength 5/5x4, neg Romberg/pronator drift [de-identified] : CNII-XII grossly intact, CRIS grossly intact

## 2019-09-19 NOTE — PROCEDURE
[FreeTextEntry1] : Carotid duplex reveals no evidence of ICA stenosis or vertebral a stenosis, flow in vertebral arteries antegrade b/l

## 2019-09-19 NOTE — HISTORY OF PRESENT ILLNESS
[FreeTextEntry1] : 74yoF w/h/o DM, type II and HTN, previous smoking hx, quit 20y prior, presented to the ED w/intractable frontal lobe headache and nausea on 08/29/2019 when stepping down off a bus.  Pt was ruled out for intracranial bleed, but incidentally noted to have a L vertebral artery stenosis on CTA neck.  Pt denies dizziness/imbalance, but was diagnosed w/vertigo 3y prior which has resolved w/Meclizine.\par \par Pt denies personal or family h/o stroke, and reports no h/o migraines in her hx.

## 2019-09-19 NOTE — DATA REVIEWED
[FreeTextEntry1] : CT head/CTA neck reviewed-no bleed, but focal L vertebral a. stenosis noted on CTA neck, no dissection or evidence of stroke

## 2019-09-19 NOTE — ASSESSMENT
[FreeTextEntry1] : Pt w/h/o headache that occurred <1wk prior, ruled out for intracranial bleed, but noted to have isolated L vertebral a. stenosis.  Duplex today WNL, no retrograde/reversal of L vertebral artery flow noted.  Recommend pt f/u w/PCP and headache specialist if symptoms persist.\par \par I personally discussed this patient with the Physician Assistant at the time of the visit. I agree with the assessment and plan as written

## 2019-11-27 ENCOUNTER — MEDICATION RENEWAL (OUTPATIENT)
Age: 74
End: 2019-11-27

## 2020-02-05 ENCOUNTER — LABORATORY RESULT (OUTPATIENT)
Age: 75
End: 2020-02-05

## 2020-02-05 ENCOUNTER — NON-APPOINTMENT (OUTPATIENT)
Age: 75
End: 2020-02-05

## 2020-02-05 ENCOUNTER — APPOINTMENT (OUTPATIENT)
Dept: INTERNAL MEDICINE | Facility: CLINIC | Age: 75
End: 2020-02-05
Payer: COMMERCIAL

## 2020-02-05 VITALS
BODY MASS INDEX: 34.55 KG/M2 | OXYGEN SATURATION: 98 % | HEIGHT: 63 IN | TEMPERATURE: 97.5 F | HEART RATE: 79 BPM | WEIGHT: 195 LBS | SYSTOLIC BLOOD PRESSURE: 128 MMHG | DIASTOLIC BLOOD PRESSURE: 70 MMHG

## 2020-02-05 DIAGNOSIS — Z01.818 ENCOUNTER FOR OTHER PREPROCEDURAL EXAMINATION: ICD-10-CM

## 2020-02-05 PROCEDURE — 93000 ELECTROCARDIOGRAM COMPLETE: CPT

## 2020-02-05 PROCEDURE — 99214 OFFICE O/P EST MOD 30 MIN: CPT | Mod: 25

## 2020-02-05 PROCEDURE — 36415 COLL VENOUS BLD VENIPUNCTURE: CPT

## 2020-02-07 NOTE — HISTORY OF PRESENT ILLNESS
[Good (7-10 METs)] : Good (7-10 METs) [No Adverse Anesthesia Reaction] : no adverse anesthesia reaction in self or family member [Aortic Stenosis] : no aortic stenosis [Recent Myocardial Infarction] : no recent myocardial infarction [Implantable Device/Pacemaker] : no implantable device/pacemaker [Atrial Fibrillation] : no atrial fibrillation [Asthma] : no asthma [Smoker] : not a smoker [COPD] : no COPD [Sleep Apnea] : no sleep apnea [Chronic Anticoagulation] : no chronic anticoagulation [Chronic Kidney Disease] : no chronic kidney disease [FreeTextEntry1] : Cataract [Diabetes] : no diabetes [FreeTextEntry3] : Unknown [FreeTextEntry2] : TBD [FreeTextEntry4] : scheduled for staged cataract extraction.

## 2020-02-07 NOTE — PHYSICAL EXAM
[Normal] : affect was normal and insight and judgment were intact [de-identified] : b/l LE muscle pains w/ subcutaneous nodules

## 2020-02-14 LAB
ALBUMIN SERPL ELPH-MCNC: 4.3 G/DL
ALP BLD-CCNC: 77 U/L
ALT SERPL-CCNC: 7 U/L
ANA PAT FLD IF-IMP: ABNORMAL
ANA SER IF-ACNC: ABNORMAL
ANION GAP SERPL CALC-SCNC: 17 MMOL/L
APPEARANCE: CLEAR
APTT BLD: 30.2 SEC
AST SERPL-CCNC: 18 U/L
BASOPHILS # BLD AUTO: 0.05 K/UL
BASOPHILS NFR BLD AUTO: 1.3 %
BILIRUB SERPL-MCNC: 0.2 MG/DL
BILIRUBIN URINE: NEGATIVE
BLOOD URINE: NEGATIVE
BUN SERPL-MCNC: 16 MG/DL
CALCIUM SERPL-MCNC: 9.3 MG/DL
CCP AB SER IA-ACNC: <8 UNITS
CHLORIDE SERPL-SCNC: 104 MMOL/L
CO2 SERPL-SCNC: 22 MMOL/L
COLOR: YELLOW
CREAT SERPL-MCNC: 0.85 MG/DL
EOSINOPHIL # BLD AUTO: 0.1 K/UL
EOSINOPHIL NFR BLD AUTO: 2.6 %
ERYTHROCYTE [SEDIMENTATION RATE] IN BLOOD BY WESTERGREN METHOD: 28 MM/HR
GLUCOSE QUALITATIVE U: NEGATIVE
GLUCOSE SERPL-MCNC: 104 MG/DL
HCT VFR BLD CALC: 36.8 %
HGB BLD-MCNC: 11.8 G/DL
IMM GRANULOCYTES NFR BLD AUTO: 0.3 %
INR PPP: 0.97 RATIO
KETONES URINE: NEGATIVE
LEUKOCYTE ESTERASE URINE: ABNORMAL
LYMPHOCYTES # BLD AUTO: 0.89 K/UL
LYMPHOCYTES NFR BLD AUTO: 23.2 %
MAN DIFF?: NORMAL
MCHC RBC-ENTMCNC: 32.1 GM/DL
MCHC RBC-ENTMCNC: 33.1 PG
MCV RBC AUTO: 103.4 FL
MONOCYTES # BLD AUTO: 0.33 K/UL
MONOCYTES NFR BLD AUTO: 8.6 %
MPO AB + PR3 PNL SER: NORMAL
NEUTROPHILS # BLD AUTO: 2.45 K/UL
NEUTROPHILS NFR BLD AUTO: 64 %
NITRITE URINE: NEGATIVE
PH URINE: 6
PLATELET # BLD AUTO: 229 K/UL
POTASSIUM SERPL-SCNC: 3.7 MMOL/L
PROT SERPL-MCNC: 6.8 G/DL
PROTEIN URINE: NEGATIVE
PT BLD: 10.9 SEC
RBC # BLD: 3.56 M/UL
RBC # FLD: 13.2 %
RF+CCP IGG SER-IMP: NEGATIVE
RHEUMATOID FACT SER QL: <10 IU/ML
SODIUM SERPL-SCNC: 143 MMOL/L
SPECIFIC GRAVITY URINE: 1.02
URATE SERPL-MCNC: 5 MG/DL
UROBILINOGEN URINE: NORMAL
WBC # FLD AUTO: 3.83 K/UL

## 2020-03-19 ENCOUNTER — APPOINTMENT (OUTPATIENT)
Dept: INTERNAL MEDICINE | Facility: CLINIC | Age: 75
End: 2020-03-19

## 2020-11-12 ENCOUNTER — APPOINTMENT (OUTPATIENT)
Dept: INTERNAL MEDICINE | Facility: CLINIC | Age: 75
End: 2020-11-12
Payer: COMMERCIAL

## 2020-11-12 ENCOUNTER — LABORATORY RESULT (OUTPATIENT)
Age: 75
End: 2020-11-12

## 2020-11-12 VITALS
OXYGEN SATURATION: 99 % | TEMPERATURE: 97.9 F | DIASTOLIC BLOOD PRESSURE: 85 MMHG | SYSTOLIC BLOOD PRESSURE: 144 MMHG | HEIGHT: 63 IN | BODY MASS INDEX: 34.73 KG/M2 | WEIGHT: 196 LBS | HEART RATE: 87 BPM

## 2020-11-12 DIAGNOSIS — H61.23 IMPACTED CERUMEN, BILATERAL: ICD-10-CM

## 2020-11-12 DIAGNOSIS — R22.9 LOCALIZED SWELLING, MASS AND LUMP, UNSPECIFIED: ICD-10-CM

## 2020-11-12 PROCEDURE — 99072 ADDL SUPL MATRL&STAF TM PHE: CPT

## 2020-11-12 PROCEDURE — 99397 PER PM REEVAL EST PAT 65+ YR: CPT | Mod: 25

## 2020-11-12 PROCEDURE — 93000 ELECTROCARDIOGRAM COMPLETE: CPT

## 2020-11-12 PROCEDURE — 36415 COLL VENOUS BLD VENIPUNCTURE: CPT

## 2020-11-12 NOTE — PHYSICAL EXAM
[No Acute Distress] : no acute distress [Well Nourished] : well nourished [Well Developed] : well developed [Well-Appearing] : well-appearing [Normal Sclera/Conjunctiva] : normal sclera/conjunctiva [EOMI] : extraocular movements intact [Normal Outer Ear/Nose] : the outer ears and nose were normal in appearance [No Respiratory Distress] : no respiratory distress  [No Accessory Muscle Use] : no accessory muscle use [Clear to Auscultation] : lungs were clear to auscultation bilaterally [Normal Rate] : normal rate  [Regular Rhythm] : with a regular rhythm [Normal S1, S2] : normal S1 and S2 [No Murmur] : no murmur heard [No Carotid Bruits] : no carotid bruits [No Abdominal Bruit] : a ~M bruit was not heard ~T in the abdomen [No Varicosities] : no varicosities [Pedal Pulses Present] : the pedal pulses are present [No Edema] : there was no peripheral edema [No Extremity Clubbing/Cyanosis] : no extremity clubbing/cyanosis [Soft] : abdomen soft [Non Tender] : non-tender [Non-distended] : non-distended [No Masses] : no abdominal mass palpated [No HSM] : no HSM [No Joint Swelling] : no joint swelling [Grossly Normal Strength/Tone] : grossly normal strength/tone [No Rash] : no rash [Coordination Grossly Intact] : coordination grossly intact [No Focal Deficits] : no focal deficits [Normal Gait] : normal gait [Normal Affect] : the affect was normal [Alert and Oriented x3] : oriented to person, place, and time [Normal Mood] : the mood was normal [Normal Insight/Judgement] : insight and judgment were intact [de-identified] : b/l cerumen impaction

## 2020-11-12 NOTE — HISTORY OF PRESENT ILLNESS
[FreeTextEntry1] : annual exam\par  [de-identified] : annual\par - doing well since last visit\par - had cataract surgery, second surgery is pending b/c of pandemic\par \par PreDm2\par - has been eating 15 pieces of candy/night\par - feels her sugar is very elevated and has been giving her headaches\par \par sarcoid\par - stable\par \par HTN\par - slightly increased today\par - compliant w/ HCTZ\par - has not seen cardio in 5yrs\par \par HCM\par - declines all vaccines

## 2020-11-16 LAB
25(OH)D3 SERPL-MCNC: 40.6 NG/ML
ALBUMIN SERPL ELPH-MCNC: 4.4 G/DL
ALP BLD-CCNC: 74 U/L
ALT SERPL-CCNC: 7 U/L
ANION GAP SERPL CALC-SCNC: 13 MMOL/L
APPEARANCE: CLEAR
AST SERPL-CCNC: 17 U/L
BASOPHILS # BLD AUTO: 0.04 K/UL
BASOPHILS NFR BLD AUTO: 1.2 %
BILIRUB SERPL-MCNC: 0.4 MG/DL
BILIRUBIN URINE: NEGATIVE
BLOOD URINE: NEGATIVE
BUN SERPL-MCNC: 14 MG/DL
CALCIUM SERPL-MCNC: 9.7 MG/DL
CHLORIDE SERPL-SCNC: 102 MMOL/L
CHOLEST SERPL-MCNC: 228 MG/DL
CO2 SERPL-SCNC: 25 MMOL/L
COLOR: YELLOW
CREAT SERPL-MCNC: 0.8 MG/DL
EOSINOPHIL # BLD AUTO: 0.07 K/UL
EOSINOPHIL NFR BLD AUTO: 2.1 %
ESTIMATED AVERAGE GLUCOSE: 120 MG/DL
GLUCOSE QUALITATIVE U: NEGATIVE
GLUCOSE SERPL-MCNC: 95 MG/DL
HBA1C MFR BLD HPLC: 5.8 %
HCT VFR BLD CALC: 37.7 %
HCV AB SER QL: NONREACTIVE
HCV S/CO RATIO: 0.09 S/CO
HDLC SERPL-MCNC: 73 MG/DL
HGB BLD-MCNC: 12.5 G/DL
IMM GRANULOCYTES NFR BLD AUTO: 0 %
KETONES URINE: NEGATIVE
LDLC SERPL CALC-MCNC: 141 MG/DL
LEUKOCYTE ESTERASE URINE: ABNORMAL
LYMPHOCYTES # BLD AUTO: 0.9 K/UL
LYMPHOCYTES NFR BLD AUTO: 27.4 %
MAN DIFF?: NORMAL
MCHC RBC-ENTMCNC: 32.6 PG
MCHC RBC-ENTMCNC: 33.2 GM/DL
MCV RBC AUTO: 98.4 FL
MONOCYTES # BLD AUTO: 0.27 K/UL
MONOCYTES NFR BLD AUTO: 8.2 %
NEUTROPHILS # BLD AUTO: 2 K/UL
NEUTROPHILS NFR BLD AUTO: 61.1 %
NITRITE URINE: NEGATIVE
NONHDLC SERPL-MCNC: 155 MG/DL
PH URINE: 7.5
PLATELET # BLD AUTO: 226 K/UL
POTASSIUM SERPL-SCNC: 3.7 MMOL/L
PROT SERPL-MCNC: 7.1 G/DL
PROTEIN URINE: NORMAL
RBC # BLD: 3.83 M/UL
RBC # FLD: 12.5 %
SODIUM SERPL-SCNC: 140 MMOL/L
SPECIFIC GRAVITY URINE: 1.02
T4 FREE SERPL-MCNC: 1.2 NG/DL
TRIGL SERPL-MCNC: 68 MG/DL
TSH SERPL-ACNC: 1.06 UIU/ML
UROBILINOGEN URINE: ABNORMAL
WBC # FLD AUTO: 3.28 K/UL

## 2021-01-22 ENCOUNTER — NON-APPOINTMENT (OUTPATIENT)
Age: 76
End: 2021-01-22

## 2021-01-22 ENCOUNTER — APPOINTMENT (OUTPATIENT)
Dept: HEART AND VASCULAR | Facility: CLINIC | Age: 76
End: 2021-01-22
Payer: COMMERCIAL

## 2021-01-22 VITALS — DIASTOLIC BLOOD PRESSURE: 80 MMHG | SYSTOLIC BLOOD PRESSURE: 124 MMHG

## 2021-01-22 VITALS — DIASTOLIC BLOOD PRESSURE: 78 MMHG | SYSTOLIC BLOOD PRESSURE: 122 MMHG

## 2021-01-22 VITALS
BODY MASS INDEX: 34.38 KG/M2 | HEART RATE: 77 BPM | WEIGHT: 194 LBS | SYSTOLIC BLOOD PRESSURE: 120 MMHG | DIASTOLIC BLOOD PRESSURE: 78 MMHG | HEIGHT: 63 IN | TEMPERATURE: 97.2 F

## 2021-01-22 DIAGNOSIS — R94.31 ABNORMAL ELECTROCARDIOGRAM [ECG] [EKG]: ICD-10-CM

## 2021-01-22 PROCEDURE — 99204 OFFICE O/P NEW MOD 45 MIN: CPT | Mod: 25

## 2021-01-22 PROCEDURE — 36415 COLL VENOUS BLD VENIPUNCTURE: CPT

## 2021-01-22 PROCEDURE — 93000 ELECTROCARDIOGRAM COMPLETE: CPT

## 2021-01-22 PROCEDURE — 99072 ADDL SUPL MATRL&STAF TM PHE: CPT

## 2021-01-22 RX ORDER — MECLIZINE HYDROCHLORIDE 12.5 MG/1
12.5 TABLET ORAL 3 TIMES DAILY
Qty: 270 | Refills: 0 | Status: DISCONTINUED | COMMUNITY
Start: 2017-05-02 | End: 2021-01-22

## 2021-01-23 LAB
CHOLEST SERPL-MCNC: 162 MG/DL
HDLC SERPL-MCNC: 75 MG/DL
LDLC SERPL CALC-MCNC: 76 MG/DL
NONHDLC SERPL-MCNC: 87 MG/DL
TRIGL SERPL-MCNC: 55 MG/DL

## 2021-01-24 NOTE — HISTORY OF PRESENT ILLNESS
[FreeTextEntry1] : The patient walks 5 blocks slowly because of knee arthritis.  She has a history of sarcoidosis.  She does not climb stairs or inclines.  She has had lower chest discomfort for years.  Last episode was a week ago and there is mild tenderness.  It lasts for a few minutes and occurs with stress.  Her atorvastatin was increased in November because of an elevated cholesterol.  The patient has infrequent palpitations.  She was dizzy while taking meclizine and the last episode occurred in November.  She denies vertigo but says her body wants to move.  She had one episode of near syncope which happened after she leaned over.  She was walking and needed to stop and hold onto the wall.  At that time these episodes were occurring every 2 to 3 days.  There have been episodes sitting down also.  It lasts a few minutes.  The patient stopped meclizine.  Her sarcoid has been treated with steroids in the past which were tapered off.

## 2021-01-24 NOTE — PHYSICAL EXAM
[General Appearance - Well Developed] : well developed [Normal Appearance] : normal appearance [Well Groomed] : well groomed [General Appearance - Well Nourished] : well nourished [No Deformities] : no deformities [General Appearance - In No Acute Distress] : no acute distress [Normal Conjunctiva] : the conjunctiva exhibited no abnormalities [Eyelids - No Xanthelasma] : the eyelids demonstrated no xanthelasmas [Normal Oral Mucosa] : normal oral mucosa [No Oral Pallor] : no oral pallor [No Oral Cyanosis] : no oral cyanosis [Arterial Pulses Normal] : the arterial pulses were normal [FreeTextEntry1] : No carotid bruit [Respiration, Rhythm And Depth] : normal respiratory rhythm and effort [Exaggerated Use Of Accessory Muscles For Inspiration] : no accessory muscle use [Auscultation Breath Sounds / Voice Sounds] : lungs were clear to auscultation bilaterally [Abdomen Soft] : soft [Abdomen Tenderness] : non-tender [Abdomen Mass (___ Cm)] : no abdominal mass palpated [Abnormal Walk] : normal gait [Gait - Sufficient For Exercise Testing] : the gait was sufficient for exercise testing [Nail Clubbing] : no clubbing of the fingernails [Cyanosis, Localized] : no localized cyanosis [Petechial Hemorrhages (___cm)] : no petechial hemorrhages [Skin Color & Pigmentation] : normal skin color and pigmentation [] : no rash [No Venous Stasis] : no venous stasis [No Skin Ulcers] : no skin ulcer [Skin Lesions] : no skin lesions [No Xanthoma] : no  xanthoma was observed [Oriented To Time, Place, And Person] : oriented to person, place, and time [Affect] : the affect was normal [No Anxiety] : not feeling anxious [Mood] : the mood was normal

## 2021-01-24 NOTE — DISCUSSION/SUMMARY
[FreeTextEntry1] : Patient has a history of sarcoidosis.  She is currently asymptomatic.  There is no history of cardiac sarcoidosis.  She will return for an echocardiogram.  She has had palpitations and dizziness.  The dizziness has abated.  She will undergo a 1 week cardiac monitor.  The patient has atypical chest discomfort.  She will return for an exercise test.  Her blood pressure is controlled.  The cholesterol level is very good.  She will continue her current medication.

## 2021-02-10 ENCOUNTER — APPOINTMENT (OUTPATIENT)
Dept: HEART AND VASCULAR | Facility: CLINIC | Age: 76
End: 2021-02-10
Payer: COMMERCIAL

## 2021-02-10 VITALS
SYSTOLIC BLOOD PRESSURE: 140 MMHG | HEART RATE: 63 BPM | WEIGHT: 195 LBS | HEIGHT: 63 IN | BODY MASS INDEX: 34.55 KG/M2 | DIASTOLIC BLOOD PRESSURE: 78 MMHG

## 2021-02-10 VITALS — TEMPERATURE: 97.7 F

## 2021-02-10 DIAGNOSIS — R40.4 TRANSIENT ALTERATION OF AWARENESS: ICD-10-CM

## 2021-02-10 PROCEDURE — 99072 ADDL SUPL MATRL&STAF TM PHE: CPT

## 2021-02-10 PROCEDURE — 99214 OFFICE O/P EST MOD 30 MIN: CPT | Mod: 25

## 2021-02-10 PROCEDURE — 93000 ELECTROCARDIOGRAM COMPLETE: CPT

## 2021-02-10 PROCEDURE — 93306 TTE W/DOPPLER COMPLETE: CPT

## 2021-02-10 NOTE — HISTORY OF PRESENT ILLNESS
[FreeTextEntry1] : The patient walks 2 blocks and is limited by arthritis.  She has not had chest discomfort.  She has had episodes of dizziness which feel like movement.

## 2021-02-10 NOTE — PHYSICAL EXAM
[General Appearance - Well Developed] : well developed [Normal Appearance] : normal appearance [Well Groomed] : well groomed [No Deformities] : no deformities [General Appearance - Well Nourished] : well nourished [General Appearance - In No Acute Distress] : no acute distress [Normal Conjunctiva] : the conjunctiva exhibited no abnormalities [Eyelids - No Xanthelasma] : the eyelids demonstrated no xanthelasmas [Normal Oral Mucosa] : normal oral mucosa [No Oral Pallor] : no oral pallor [No Oral Cyanosis] : no oral cyanosis [Respiration, Rhythm And Depth] : normal respiratory rhythm and effort [Exaggerated Use Of Accessory Muscles For Inspiration] : no accessory muscle use [Auscultation Breath Sounds / Voice Sounds] : lungs were clear to auscultation bilaterally [Arterial Pulses Normal] : the arterial pulses were normal [FreeTextEntry1] : No carotid bruit [Abdomen Soft] : soft [Abdomen Tenderness] : non-tender [Abdomen Mass (___ Cm)] : no abdominal mass palpated [Abnormal Walk] : normal gait [Gait - Sufficient For Exercise Testing] : the gait was sufficient for exercise testing [Nail Clubbing] : no clubbing of the fingernails [Cyanosis, Localized] : no localized cyanosis [Petechial Hemorrhages (___cm)] : no petechial hemorrhages [Skin Color & Pigmentation] : normal skin color and pigmentation [] : no rash [No Venous Stasis] : no venous stasis [Skin Lesions] : no skin lesions [No Skin Ulcers] : no skin ulcer [No Xanthoma] : no  xanthoma was observed [Oriented To Time, Place, And Person] : oriented to person, place, and time [Affect] : the affect was normal [Mood] : the mood was normal [No Anxiety] : not feeling anxious

## 2021-02-10 NOTE — DISCUSSION/SUMMARY
[FreeTextEntry1] : The patient has had atypical chest discomfort.  Her EKG shows T wave abnormalities.  The echocardiogram shows a mildly reduced ejection fraction of 45%.  This is a new finding.  The prior EKG and laboratory results were reviewed.  The patient is unable to walk on a treadmill.  She will undergo pharmacologic nuclear stress test.  She is having vertigo and is off balance.  A walker was recommended.  Her monitor result is pending.  Her blood pressure is controlled.  She will continue her current medication.

## 2021-02-11 ENCOUNTER — APPOINTMENT (OUTPATIENT)
Dept: HEART AND VASCULAR | Facility: CLINIC | Age: 76
End: 2021-02-11

## 2021-02-11 ENCOUNTER — NON-APPOINTMENT (OUTPATIENT)
Age: 76
End: 2021-02-11

## 2021-02-11 PROBLEM — R40.4 ALTERED AWARENESS, TRANSIENT: Status: ACTIVE | Noted: 2021-02-11

## 2021-02-16 DIAGNOSIS — I49.3 VENTRICULAR PREMATURE DEPOLARIZATION: ICD-10-CM

## 2021-02-18 ENCOUNTER — APPOINTMENT (OUTPATIENT)
Dept: INTERNAL MEDICINE | Facility: CLINIC | Age: 76
End: 2021-02-18

## 2021-02-19 ENCOUNTER — APPOINTMENT (OUTPATIENT)
Dept: INTERNAL MEDICINE | Facility: CLINIC | Age: 76
End: 2021-02-19

## 2021-03-02 ENCOUNTER — APPOINTMENT (OUTPATIENT)
Dept: HEART AND VASCULAR | Facility: CLINIC | Age: 76
End: 2021-03-02
Payer: COMMERCIAL

## 2021-03-02 VITALS — BODY MASS INDEX: 34.55 KG/M2 | WEIGHT: 195 LBS | HEIGHT: 63 IN

## 2021-03-02 PROCEDURE — 99072 ADDL SUPL MATRL&STAF TM PHE: CPT

## 2021-03-02 PROCEDURE — ZZZZZ: CPT

## 2021-03-02 PROCEDURE — 93015 CV STRESS TEST SUPVJ I&R: CPT

## 2021-03-02 PROCEDURE — A9500: CPT

## 2021-03-02 PROCEDURE — 78452 HT MUSCLE IMAGE SPECT MULT: CPT

## 2021-03-05 ENCOUNTER — APPOINTMENT (OUTPATIENT)
Dept: HEART AND VASCULAR | Facility: CLINIC | Age: 76
End: 2021-03-05
Payer: COMMERCIAL

## 2021-03-05 PROCEDURE — 99214 OFFICE O/P EST MOD 30 MIN: CPT | Mod: 95

## 2021-03-05 RX ORDER — ATORVASTATIN CALCIUM 40 MG/1
40 TABLET, FILM COATED ORAL
Qty: 90 | Refills: 3 | Status: DISCONTINUED | COMMUNITY
Start: 2019-09-06 | End: 2021-03-05

## 2021-03-05 RX ORDER — MECLIZINE HYDROCHLORIDE 25 MG/1
25 TABLET ORAL
Refills: 0 | Status: DISCONTINUED | COMMUNITY
End: 2021-03-05

## 2021-03-07 NOTE — HISTORY OF PRESENT ILLNESS
[Home] : at home, [unfilled] , at the time of the visit. [Medical Office: ([facility])___] : at the medical office located in  [Verbal consent obtained from patient] : the patient, [unfilled] [FreeTextEntry1] : Her dizziness worsened after the Covid vaccine but now has improved.  She has been dizzy for 5-6 years and it is associated with movement.  She denies chest discomfort.  She walks 2 blocks without difficulty.  She has not checked her blood pressure or her weight.  Her daughter has severe coronary disease.  She has no contrast allergy.

## 2021-03-07 NOTE — DISCUSSION/SUMMARY
[FreeTextEntry1] : The patient has a reduced ejection fraction.  She has a high risk nuclear stress test.  The EKG stress test and echocardiogram were reviewed.  She is at significant risk for coronary event.  The risks benefits and alternatives to coronary angiogram with possible stenting or other revascularization was discussed.  The patient agrees.  I communicated with the cardiac catheterization laboratory. The pattern is not typical of sarcoid but it is possible. Will consider PET scan after angiogram. We will get details of sarcoid evaluation and treatment.  The cholesterol is elevated.  The patient will switch to rosuvastatin.  She will continue her other medication.

## 2021-03-09 ENCOUNTER — LABORATORY RESULT (OUTPATIENT)
Age: 76
End: 2021-03-09

## 2021-03-10 VITALS
OXYGEN SATURATION: 99 % | SYSTOLIC BLOOD PRESSURE: 141 MMHG | HEIGHT: 64 IN | RESPIRATION RATE: 17 BRPM | TEMPERATURE: 98 F | HEART RATE: 88 BPM | DIASTOLIC BLOOD PRESSURE: 71 MMHG | WEIGHT: 192.02 LBS

## 2021-03-10 RX ORDER — CHLORHEXIDINE GLUCONATE 213 G/1000ML
1 SOLUTION TOPICAL ONCE
Refills: 0 | Status: DISCONTINUED | OUTPATIENT
Start: 2021-03-12 | End: 2021-03-13

## 2021-03-10 NOTE — H&P ADULT - HISTORY OF PRESENT ILLNESS
COVID Status:  Pharmacy: [facility]; [address], [place] [address]  Escort: Leslie Rey (co-worker)      Pt is a 76 y/o F former smoker (1ppd x 20 years) w/ FH of CAD (daughter has stents) and PMH of HTN, HLD, DM, sarcoidosis, arthritis, gastritis, vertigo, and vitamin D deficiency is presenting today with intermittent dizziness for a planned coronary angiogram. Patient has had dizziness that is associated w/ movement for 5-6 years and is alleviated with rest and deep breaths. Patient states the dizziness is 4/10, sometimes has nausea/vomiting, and sometimes has orthopnea. She was initially put on Meclazine which worked, but it stopped helping in November, which is why patient was referred for a pharmacological NST that revealed a 50% EF and a moderate size reversible defect in the base mid anteroseptum and apical septum as well a small sized reversible defect in the basal inferolateral segment. EKG also revealed bradycardia. In light of patient's risk factors, abnormal NST, and newly reduced EF, patient is now being referred for coronary angiogram with possible stenting if clinically indicated on 3/12/21.    Patient denies chest pain, SOB, syncope, diaphoresis, LE edema, orthopnea, palpitations, PND, fatigue, trauma.         [place]KE[place]    COVID Status:  Pharmacy: [facility]; [address], [place] [address]  Escort: eLslie Rey (co-worker)      Pt is a 76 y/o F former smoker (1ppd x 20 years) w/ FH of CAD (daughter has stents) and PMH of HTN, HLD, DM, sarcoidosis, arthritis, gastritis, vertigo, and vitamin D deficiency is presenting today with intermittent dizziness for a planned coronary angiogram. Patient has had dizziness that is associated w/ movement for 5-6 years and is alleviated with rest and deep breaths. Patient states the dizziness is 4/10, sometimes has nausea/vomiting, and sometimes has orthopnea. She was initially put on Meclazine which worked, but it stopped helping in November, which is why patient was referred for a pharmacological NST that revealed a 50% EF and a moderate size reversible defect in the base mid anteroseptum and apical septum as well a small sized reversible defect in the basal inferolateral segment. EKG also revealed bradycardia. In light of patient's risk factors, abnormal NST, and newly reduced EF, patient is now being referred for coronary angiogram with possible stenting if clinically indicated on 3/12/21.    Patient denies chest pain, SOB, syncope, diaphoresis, LE edema, orthopnea, palpitations, PND, fatigue, trauma.       COVID Status: Negative 3/9 [facility]  Pharmacy: [facility]; [address], [place] [address]  Escort: Leslie Rey (co-worker)  Cardiologist Dr. Naldo Bennett    *Verify Meds*    76 y/o F, former smoker (1ppd x 20 years) w/ FH of CAD (daughter has stents) and PMH of HTN, HLD, DM, sarcoidosis, arthritis, gastritis, vertigo, and vitamin D deficiency who presented to outpatient cardiologist Dr. Bennett with c/o intermittent dizziness. Patient endorsed dizziness associated w/ movement for 5-6 years and is alleviated with rest and deep breaths. Additionally endorses nausea/vomiting. Denies chest pain, SOB, syncope, diaphoresis, LE edema, orthopnea, palpitations, PND, fatigue, trauma. She was put on Meclizine which initially worked, however became ineffective in November at which time she was referred for a stress test. NST 3/2/21: 50% EF and a moderate size reversible defect in the base mid anteroseptum and apical septum as well a small sized reversible defect in the basal inferolateral segment. In light of patient's risk factors, angina equivalent symptoms and abnormal NST, patient is referred for coronary angiogram with possible intervention if clinically indicated.           COVID Status: Negative 3/9 [facility]  Pharmacy: [facility]; [address], [place] [address]  Escort: Leslie Rey (co-worker)  Cardiologist: Dr. Naldo Bennett    Medications verified w/ patient on arrival, patient provided bottles     78 y/o female, former smoker (1 PPD x 20 years) w/ FHx CAD (daughter has stents) and PMHx HTN, HLD, DM, sarcoidosis, arthritis, gastritis, vertigo, and vitamin D deficiency who presented to outpatient cardiologist, Dr. Bennett, c/o intermittent dizziness. Patient endorsed dizziness associated w/ movement for 5-6 years and is alleviated w/ rest and deep breaths. Additionally, patient endorses intermitten nausea/vomiting. Patient denies chest pain, SOB, syncope, diaphoresis, LE edema, orthopnea, palpitations, PND, fatigue, and trauma. She was put on Meclizine which initially worked, however became ineffective in November at which time she was referred for a stress test, and patient reports she stopped taking Meclizine in 02/2021. NST 03/02/2021 revealed 50% EF and a moderate size reversible defect in the base mid anteroseptum and apical septum as well a small sized reversible defect in the basal inferolateral segment.    In light of patient's risk factors, anginal equivalent symptoms, and abnormal NST, patient is referred for coronary angiogram w/ possible intervention if clinically indicated.

## 2021-03-10 NOTE — H&P ADULT - NSHPLABSRESULTS_GEN_ALL_CORE
CBC Full  -  ( 12 Mar 2021 16:27 )  WBC Count : 4.49 K/uL  RBC Count : 3.89 M/uL  Hemoglobin : 12.5 g/dL  Hematocrit : 37.7 %  Platelet Count - Automated : 198 K/uL  Mean Cell Volume : 96.9 fl  Mean Cell Hemoglobin : 32.1 pg  Mean Cell Hemoglobin Concentration : 33.2 gm/dL  Auto Neutrophil # : 2.78 K/uL  Auto Lymphocyte # : 1.22 K/uL  Auto Monocyte # : 0.36 K/uL  Auto Eosinophil # : 0.08 K/uL  Auto Basophil # : 0.04 K/uL  Auto Neutrophil % : 61.9 %  Auto Lymphocyte % : 27.2 %  Auto Monocyte % : 8.0 %  Auto Eosinophil % : 1.8 %  Auto Basophil % : 0.9 %    03-12    140  |  100  |  14  ----------------------------<  103<H>  3.8   |  28  |  0.76    Ca    9.9      12 Mar 2021 16:27    TPro  7.7  /  Alb  4.3  /  TBili  0.2  /  DBili  x   /  AST  18  /  ALT  9<L>  /  AlkPhos  92  03-12

## 2021-03-10 NOTE — H&P ADULT - NSICDXPASTMEDICALHX_GEN_ALL_CORE_FT
PAST MEDICAL HISTORY:  Cataract, left Patient has not gone for left eye cataract surgery because of COVID    Gastritis     Hyperlipidemia     Hypertension     Sarcoidosis     Type 2 diabetes mellitus     Vertigo     Vitamin D deficiency

## 2021-03-10 NOTE — H&P ADULT - ASSESSMENT
76 y/o female, former smoker (1 PPD x 20 years) w/ FHx CAD (daughter has stents) and PMHx HTN, HLD, DM, sarcoidosis, arthritis, gastritis, vertigo, and vitamin D deficiency who presents for cardiac catheterization w/ possible intervention if clinically indicated in light of patient's risk factors, anginal equivalent symptoms, and abnormal NST results.     EKG on arrival showed SR at 74 bpm w/ PAC's and non-specific T wave abnormalities. Labs on arrival within normal limits. Patient reported taking her daily medication of Aspirin 81 mg on 03/12/2021 prior to arrival to [facility]. Patient was ordered for Plavix 600 mg PO once and NS 50 cc/hour x 4 hours.  				  ASA: III			Mallampati class: III	            Anginal Class: Anginal Equivalent     Sedation Plan: Moderate   Patient Is Suitable Candidate For Sedation: Yes       Risks & benefits of procedure and sedation and risks and benefits for the alternative therapy have been explained to the patient in layman’s terms including but not limited to: allergic reaction, bleeding, infection, arrhythmia, respiratory compromise, renal and vascular compromise, limb damage, MI, CVA, emergent CABG/Vascular Surgery and death. Informed consent obtained and in chart.

## 2021-03-10 NOTE — H&P ADULT - NSHPSOCIALHISTORY_GEN_ALL_CORE
Patient is a former smoker who used to smoke 1 ppd x 20 years (quit in 1990). Patient denies drug and alcohol use. Patient is a former smoker, quit in 1990, and previously smoked about 1 PPD x 20 years. Patient denies any ETOH or illicit drug use.

## 2021-03-12 ENCOUNTER — INPATIENT (INPATIENT)
Facility: HOSPITAL | Age: 76
LOS: 0 days | Discharge: HOME | DRG: 247 | End: 2021-03-13
Attending: INTERNAL MEDICINE | Admitting: INTERNAL MEDICINE
Payer: COMMERCIAL

## 2021-03-12 DIAGNOSIS — Z98.49 CATARACT EXTRACTION STATUS, UNSPECIFIED EYE: Chronic | ICD-10-CM

## 2021-03-12 LAB
A1C WITH ESTIMATED AVERAGE GLUCOSE RESULT: 5.7 % — HIGH (ref 4–5.6)
ALBUMIN SERPL ELPH-MCNC: 4.3 G/DL — SIGNIFICANT CHANGE UP (ref 3.3–5)
ALP SERPL-CCNC: 92 U/L — SIGNIFICANT CHANGE UP (ref 40–120)
ALT FLD-CCNC: 9 U/L — LOW (ref 10–45)
ANION GAP SERPL CALC-SCNC: 12 MMOL/L — SIGNIFICANT CHANGE UP (ref 5–17)
APTT BLD: 30.8 SEC — SIGNIFICANT CHANGE UP (ref 27.5–35.5)
AST SERPL-CCNC: 18 U/L — SIGNIFICANT CHANGE UP (ref 10–40)
BASOPHILS # BLD AUTO: 0.04 K/UL — SIGNIFICANT CHANGE UP (ref 0–0.2)
BASOPHILS NFR BLD AUTO: 0.9 % — SIGNIFICANT CHANGE UP (ref 0–2)
BILIRUB SERPL-MCNC: 0.2 MG/DL — SIGNIFICANT CHANGE UP (ref 0.2–1.2)
BUN SERPL-MCNC: 14 MG/DL — SIGNIFICANT CHANGE UP (ref 7–23)
CALCIUM SERPL-MCNC: 9.9 MG/DL — SIGNIFICANT CHANGE UP (ref 8.4–10.5)
CHLORIDE SERPL-SCNC: 100 MMOL/L — SIGNIFICANT CHANGE UP (ref 96–108)
CHOLEST SERPL-MCNC: 148 MG/DL — SIGNIFICANT CHANGE UP
CK MB CFR SERPL CALC: 2.2 NG/ML — SIGNIFICANT CHANGE UP (ref 0–6.7)
CK SERPL-CCNC: 128 U/L — SIGNIFICANT CHANGE UP (ref 25–170)
CO2 SERPL-SCNC: 28 MMOL/L — SIGNIFICANT CHANGE UP (ref 22–31)
CREAT SERPL-MCNC: 0.76 MG/DL — SIGNIFICANT CHANGE UP (ref 0.5–1.3)
EOSINOPHIL # BLD AUTO: 0.08 K/UL — SIGNIFICANT CHANGE UP (ref 0–0.5)
EOSINOPHIL NFR BLD AUTO: 1.8 % — SIGNIFICANT CHANGE UP (ref 0–6)
ESTIMATED AVERAGE GLUCOSE: 117 MG/DL — HIGH (ref 68–114)
GLUCOSE BLDC GLUCOMTR-MCNC: 108 MG/DL — HIGH (ref 70–99)
GLUCOSE BLDC GLUCOMTR-MCNC: 90 MG/DL — SIGNIFICANT CHANGE UP (ref 70–99)
GLUCOSE BLDC GLUCOMTR-MCNC: 99 MG/DL — SIGNIFICANT CHANGE UP (ref 70–99)
GLUCOSE SERPL-MCNC: 103 MG/DL — HIGH (ref 70–99)
HCT VFR BLD CALC: 37.7 % — SIGNIFICANT CHANGE UP (ref 34.5–45)
HDLC SERPL-MCNC: 83 MG/DL — SIGNIFICANT CHANGE UP
HGB BLD-MCNC: 12.5 G/DL — SIGNIFICANT CHANGE UP (ref 11.5–15.5)
IMM GRANULOCYTES NFR BLD AUTO: 0.2 % — SIGNIFICANT CHANGE UP (ref 0–1.5)
INR BLD: 1.03 — SIGNIFICANT CHANGE UP (ref 0.88–1.16)
LIPID PNL WITH DIRECT LDL SERPL: 49 MG/DL — SIGNIFICANT CHANGE UP
LYMPHOCYTES # BLD AUTO: 1.22 K/UL — SIGNIFICANT CHANGE UP (ref 1–3.3)
LYMPHOCYTES # BLD AUTO: 27.2 % — SIGNIFICANT CHANGE UP (ref 13–44)
MCHC RBC-ENTMCNC: 32.1 PG — SIGNIFICANT CHANGE UP (ref 27–34)
MCHC RBC-ENTMCNC: 33.2 GM/DL — SIGNIFICANT CHANGE UP (ref 32–36)
MCV RBC AUTO: 96.9 FL — SIGNIFICANT CHANGE UP (ref 80–100)
MONOCYTES # BLD AUTO: 0.36 K/UL — SIGNIFICANT CHANGE UP (ref 0–0.9)
MONOCYTES NFR BLD AUTO: 8 % — SIGNIFICANT CHANGE UP (ref 2–14)
NEUTROPHILS # BLD AUTO: 2.78 K/UL — SIGNIFICANT CHANGE UP (ref 1.8–7.4)
NEUTROPHILS NFR BLD AUTO: 61.9 % — SIGNIFICANT CHANGE UP (ref 43–77)
NON HDL CHOLESTEROL: 65 MG/DL — SIGNIFICANT CHANGE UP
NRBC # BLD: 0 /100 WBCS — SIGNIFICANT CHANGE UP (ref 0–0)
PLATELET # BLD AUTO: 198 K/UL — SIGNIFICANT CHANGE UP (ref 150–400)
POTASSIUM SERPL-MCNC: 3.8 MMOL/L — SIGNIFICANT CHANGE UP (ref 3.5–5.3)
POTASSIUM SERPL-SCNC: 3.8 MMOL/L — SIGNIFICANT CHANGE UP (ref 3.5–5.3)
PROT SERPL-MCNC: 7.7 G/DL — SIGNIFICANT CHANGE UP (ref 6–8.3)
PROTHROM AB SERPL-ACNC: 12.3 SEC — SIGNIFICANT CHANGE UP (ref 10.6–13.6)
RBC # BLD: 3.89 M/UL — SIGNIFICANT CHANGE UP (ref 3.8–5.2)
RBC # FLD: 12.7 % — SIGNIFICANT CHANGE UP (ref 10.3–14.5)
SODIUM SERPL-SCNC: 140 MMOL/L — SIGNIFICANT CHANGE UP (ref 135–145)
TRIGL SERPL-MCNC: 78 MG/DL — SIGNIFICANT CHANGE UP
WBC # BLD: 4.49 K/UL — SIGNIFICANT CHANGE UP (ref 3.8–10.5)
WBC # FLD AUTO: 4.49 K/UL — SIGNIFICANT CHANGE UP (ref 3.8–10.5)

## 2021-03-12 PROCEDURE — 92933 PRQ TRLML C ATHRC ST ANGIOP1: CPT | Mod: LD

## 2021-03-12 PROCEDURE — 99152 MOD SED SAME PHYS/QHP 5/>YRS: CPT

## 2021-03-12 PROCEDURE — 93454 CORONARY ARTERY ANGIO S&I: CPT | Mod: 26,59

## 2021-03-12 RX ORDER — GLUCAGON INJECTION, SOLUTION 0.5 MG/.1ML
1 INJECTION, SOLUTION SUBCUTANEOUS ONCE
Refills: 0 | Status: DISCONTINUED | OUTPATIENT
Start: 2021-03-12 | End: 2021-03-13

## 2021-03-12 RX ORDER — SODIUM CHLORIDE 9 MG/ML
500 INJECTION INTRAMUSCULAR; INTRAVENOUS; SUBCUTANEOUS
Refills: 0 | Status: DISCONTINUED | OUTPATIENT
Start: 2021-03-12 | End: 2021-03-12

## 2021-03-12 RX ORDER — DEXTROSE 50 % IN WATER 50 %
25 SYRINGE (ML) INTRAVENOUS ONCE
Refills: 0 | Status: DISCONTINUED | OUTPATIENT
Start: 2021-03-12 | End: 2021-03-13

## 2021-03-12 RX ORDER — ASPIRIN/CALCIUM CARB/MAGNESIUM 324 MG
1 TABLET ORAL
Qty: 0 | Refills: 0 | DISCHARGE

## 2021-03-12 RX ORDER — ATORVASTATIN CALCIUM 80 MG/1
80 TABLET, FILM COATED ORAL AT BEDTIME
Refills: 0 | Status: DISCONTINUED | OUTPATIENT
Start: 2021-03-12 | End: 2021-03-13

## 2021-03-12 RX ORDER — DEXTROSE 50 % IN WATER 50 %
12.5 SYRINGE (ML) INTRAVENOUS ONCE
Refills: 0 | Status: DISCONTINUED | OUTPATIENT
Start: 2021-03-12 | End: 2021-03-13

## 2021-03-12 RX ORDER — HYDROCHLOROTHIAZIDE 25 MG
25 TABLET ORAL DAILY
Refills: 0 | Status: DISCONTINUED | OUTPATIENT
Start: 2021-03-13 | End: 2021-03-13

## 2021-03-12 RX ORDER — CLOPIDOGREL BISULFATE 75 MG/1
75 TABLET, FILM COATED ORAL DAILY
Refills: 0 | Status: DISCONTINUED | OUTPATIENT
Start: 2021-03-13 | End: 2021-03-13

## 2021-03-12 RX ORDER — SODIUM CHLORIDE 9 MG/ML
500 INJECTION INTRAMUSCULAR; INTRAVENOUS; SUBCUTANEOUS
Refills: 0 | Status: DISCONTINUED | OUTPATIENT
Start: 2021-03-12 | End: 2021-03-13

## 2021-03-12 RX ORDER — DEXTROSE 50 % IN WATER 50 %
15 SYRINGE (ML) INTRAVENOUS ONCE
Refills: 0 | Status: DISCONTINUED | OUTPATIENT
Start: 2021-03-12 | End: 2021-03-13

## 2021-03-12 RX ORDER — CLOPIDOGREL BISULFATE 75 MG/1
600 TABLET, FILM COATED ORAL ONCE
Refills: 0 | Status: COMPLETED | OUTPATIENT
Start: 2021-03-12 | End: 2021-03-12

## 2021-03-12 RX ORDER — ASPIRIN/CALCIUM CARB/MAGNESIUM 324 MG
81 TABLET ORAL DAILY
Refills: 0 | Status: DISCONTINUED | OUTPATIENT
Start: 2021-03-13 | End: 2021-03-13

## 2021-03-12 RX ORDER — ERGOCALCIFEROL 1.25 MG/1
1 CAPSULE ORAL
Qty: 0 | Refills: 0 | DISCHARGE

## 2021-03-12 RX ORDER — SODIUM CHLORIDE 9 MG/ML
1000 INJECTION, SOLUTION INTRAVENOUS
Refills: 0 | Status: DISCONTINUED | OUTPATIENT
Start: 2021-03-12 | End: 2021-03-13

## 2021-03-12 RX ORDER — ROSUVASTATIN CALCIUM 5 MG/1
1 TABLET ORAL
Qty: 0 | Refills: 0 | DISCHARGE

## 2021-03-12 RX ORDER — INSULIN LISPRO 100/ML
VIAL (ML) SUBCUTANEOUS
Refills: 0 | Status: DISCONTINUED | OUTPATIENT
Start: 2021-03-12 | End: 2021-03-13

## 2021-03-12 RX ORDER — METFORMIN HYDROCHLORIDE 850 MG/1
1 TABLET ORAL
Qty: 0 | Refills: 0 | DISCHARGE

## 2021-03-12 RX ADMIN — CLOPIDOGREL BISULFATE 600 MILLIGRAM(S): 75 TABLET, FILM COATED ORAL at 17:18

## 2021-03-12 RX ADMIN — SODIUM CHLORIDE 75 MILLILITER(S): 9 INJECTION INTRAMUSCULAR; INTRAVENOUS; SUBCUTANEOUS at 20:30

## 2021-03-12 RX ADMIN — SODIUM CHLORIDE 50 MILLILITER(S): 9 INJECTION INTRAMUSCULAR; INTRAVENOUS; SUBCUTANEOUS at 17:18

## 2021-03-13 ENCOUNTER — TRANSCRIPTION ENCOUNTER (OUTPATIENT)
Age: 76
End: 2021-03-13

## 2021-03-13 VITALS — TEMPERATURE: 99 F

## 2021-03-13 LAB
ANION GAP SERPL CALC-SCNC: 12 MMOL/L — SIGNIFICANT CHANGE UP (ref 5–17)
BLD GP AB SCN SERPL QL: NEGATIVE — SIGNIFICANT CHANGE UP
BUN SERPL-MCNC: 12 MG/DL — SIGNIFICANT CHANGE UP (ref 7–23)
CALCIUM SERPL-MCNC: 9.4 MG/DL — SIGNIFICANT CHANGE UP (ref 8.4–10.5)
CHLORIDE SERPL-SCNC: 101 MMOL/L — SIGNIFICANT CHANGE UP (ref 96–108)
CO2 SERPL-SCNC: 25 MMOL/L — SIGNIFICANT CHANGE UP (ref 22–31)
CREAT SERPL-MCNC: 0.69 MG/DL — SIGNIFICANT CHANGE UP (ref 0.5–1.3)
GLUCOSE BLDC GLUCOMTR-MCNC: 94 MG/DL — SIGNIFICANT CHANGE UP (ref 70–99)
GLUCOSE BLDC GLUCOMTR-MCNC: 97 MG/DL — SIGNIFICANT CHANGE UP (ref 70–99)
GLUCOSE BLDC GLUCOMTR-MCNC: 99 MG/DL — SIGNIFICANT CHANGE UP (ref 70–99)
GLUCOSE SERPL-MCNC: 96 MG/DL — SIGNIFICANT CHANGE UP (ref 70–99)
HCT VFR BLD CALC: 32.3 % — LOW (ref 34.5–45)
HCT VFR BLD CALC: 32.4 % — LOW (ref 34.5–45)
HGB BLD-MCNC: 10.7 G/DL — LOW (ref 11.5–15.5)
HGB BLD-MCNC: 10.7 G/DL — LOW (ref 11.5–15.5)
MAGNESIUM SERPL-MCNC: 2 MG/DL — SIGNIFICANT CHANGE UP (ref 1.6–2.6)
MCHC RBC-ENTMCNC: 32.2 PG — SIGNIFICANT CHANGE UP (ref 27–34)
MCHC RBC-ENTMCNC: 32.4 PG — SIGNIFICANT CHANGE UP (ref 27–34)
MCHC RBC-ENTMCNC: 33 GM/DL — SIGNIFICANT CHANGE UP (ref 32–36)
MCHC RBC-ENTMCNC: 33.1 GM/DL — SIGNIFICANT CHANGE UP (ref 32–36)
MCV RBC AUTO: 97.3 FL — SIGNIFICANT CHANGE UP (ref 80–100)
MCV RBC AUTO: 98.2 FL — SIGNIFICANT CHANGE UP (ref 80–100)
NRBC # BLD: 0 /100 WBCS — SIGNIFICANT CHANGE UP (ref 0–0)
NRBC # BLD: 0 /100 WBCS — SIGNIFICANT CHANGE UP (ref 0–0)
PLATELET # BLD AUTO: 166 K/UL — SIGNIFICANT CHANGE UP (ref 150–400)
PLATELET # BLD AUTO: 171 K/UL — SIGNIFICANT CHANGE UP (ref 150–400)
POTASSIUM SERPL-MCNC: 3.5 MMOL/L — SIGNIFICANT CHANGE UP (ref 3.5–5.3)
POTASSIUM SERPL-SCNC: 3.5 MMOL/L — SIGNIFICANT CHANGE UP (ref 3.5–5.3)
RBC # BLD: 3.3 M/UL — LOW (ref 3.8–5.2)
RBC # BLD: 3.32 M/UL — LOW (ref 3.8–5.2)
RBC # FLD: 12.7 % — SIGNIFICANT CHANGE UP (ref 10.3–14.5)
RBC # FLD: 12.8 % — SIGNIFICANT CHANGE UP (ref 10.3–14.5)
RH IG SCN BLD-IMP: POSITIVE — SIGNIFICANT CHANGE UP
SODIUM SERPL-SCNC: 138 MMOL/L — SIGNIFICANT CHANGE UP (ref 135–145)
WBC # BLD: 4.67 K/UL — SIGNIFICANT CHANGE UP (ref 3.8–10.5)
WBC # BLD: 5.57 K/UL — SIGNIFICANT CHANGE UP (ref 3.8–10.5)
WBC # FLD AUTO: 4.67 K/UL — SIGNIFICANT CHANGE UP (ref 3.8–10.5)
WBC # FLD AUTO: 5.57 K/UL — SIGNIFICANT CHANGE UP (ref 3.8–10.5)

## 2021-03-13 PROCEDURE — 99239 HOSP IP/OBS DSCHRG MGMT >30: CPT

## 2021-03-13 RX ORDER — ATROPINE SULFATE 0.1 MG/ML
0.5 SYRINGE (ML) INJECTION ONCE
Refills: 0 | Status: DISCONTINUED | OUTPATIENT
Start: 2021-03-13 | End: 2021-03-13

## 2021-03-13 RX ORDER — METOPROLOL TARTRATE 50 MG
1 TABLET ORAL
Qty: 30 | Refills: 3
Start: 2021-03-13 | End: 2021-07-10

## 2021-03-13 RX ORDER — CLOPIDOGREL BISULFATE 75 MG/1
1 TABLET, FILM COATED ORAL
Qty: 30 | Refills: 11
Start: 2021-03-13 | End: 2022-03-07

## 2021-03-13 RX ORDER — SODIUM CHLORIDE 9 MG/ML
500 INJECTION INTRAMUSCULAR; INTRAVENOUS; SUBCUTANEOUS ONCE
Refills: 0 | Status: COMPLETED | OUTPATIENT
Start: 2021-03-13 | End: 2021-03-13

## 2021-03-13 RX ORDER — POTASSIUM CHLORIDE 20 MEQ
40 PACKET (EA) ORAL ONCE
Refills: 0 | Status: DISCONTINUED | OUTPATIENT
Start: 2021-03-13 | End: 2021-03-13

## 2021-03-13 RX ORDER — ATROPINE SULFATE 0.1 MG/ML
1 SYRINGE (ML) INJECTION ONCE
Refills: 0 | Status: COMPLETED | OUTPATIENT
Start: 2021-03-13 | End: 2021-03-13

## 2021-03-13 RX ORDER — SODIUM CHLORIDE 9 MG/ML
250 INJECTION INTRAMUSCULAR; INTRAVENOUS; SUBCUTANEOUS ONCE
Refills: 0 | Status: COMPLETED | OUTPATIENT
Start: 2021-03-13 | End: 2021-03-13

## 2021-03-13 RX ADMIN — Medication 1 MILLIGRAM(S): at 00:20

## 2021-03-13 RX ADMIN — Medication 30 MILLILITER(S): at 00:45

## 2021-03-13 RX ADMIN — Medication 81 MILLIGRAM(S): at 10:54

## 2021-03-13 RX ADMIN — Medication 25 MILLIGRAM(S): at 07:03

## 2021-03-13 RX ADMIN — SODIUM CHLORIDE 1000 MILLILITER(S): 9 INJECTION INTRAMUSCULAR; INTRAVENOUS; SUBCUTANEOUS at 00:25

## 2021-03-13 RX ADMIN — CLOPIDOGREL BISULFATE 75 MILLIGRAM(S): 75 TABLET, FILM COATED ORAL at 10:55

## 2021-03-13 RX ADMIN — SODIUM CHLORIDE 1500 MILLILITER(S): 9 INJECTION INTRAMUSCULAR; INTRAVENOUS; SUBCUTANEOUS at 00:10

## 2021-03-13 NOTE — DISCHARGE NOTE PROVIDER - NSDCCPCAREPLAN_GEN_ALL_CORE_FT
PRINCIPAL DISCHARGE DIAGNOSIS  Diagnosis: Coronary artery disease  Assessment and Plan of Treatment: You underwent a cardiac catheterization receiving a Stent to your left anterior descending artery which was 75% blocked.    - please continue to take aspirin 81mg daily and START taking Plavix 75mg daily.  DO NOT STOP THESE MEDICATIONS AS THEY PREVENT YOUR STENT FROM    - Becuase of coronary artery disease please START taking metoprolol XL 25mg daily.   - Please follow up with Dr. Bennett in 1-2 weeks  You underwent a coronary angiogram and should wait 3 days before returning to ordinary activities. Do not drive for 2 days. Consult your doctor before returning to vigorous activity. You may return to work in 3-5 days. The catheter from your groin/wrist was removed and you should remove the dressing in 24 hours. You may shower once the dressing is removed, but avoid baths, hot tubs, or swimming for 5 days to prevent infection. If you notice bleeding from the site, hardening and pain at the site, drainage or redness from the site, coolness/paleness of the extremity, swelling, or fever, please call [phone]. pharmacy.      SECONDARY DISCHARGE DIAGNOSES  Diagnosis: Hyperlipidemia  Assessment and Plan of Treatment: YOur cholesterol is controlled.  Continue taking Atorvastatin 40mg daily    Diagnosis: Hypertension  Assessment and Plan of Treatment: Continue to take HCTZ daily.  Given coronary artery disease please STARt taking metoprolol xL 25mg daily.    Diagnosis: Diabetes  Assessment and Plan of Treatment: YOur diabetes is well controlled.  please HOLD your metformin for two days after the procedure becuase of the contrast dye, you can restart on 3/15.     PRINCIPAL DISCHARGE DIAGNOSIS  Diagnosis: Coronary artery disease  Assessment and Plan of Treatment: You underwent a cardiac catheterization receiving a Stent to your left anterior descending artery which was 75% blocked.    - please continue to take aspirin 81mg daily and START taking Plavix 75mg daily.  DO NOT STOP THESE MEDICATIONS AS THEY PREVENT YOUR STENT FROM    - Becuase of coronary artery disease please START taking metoprolol XL 25mg daily.   - Please follow up with Dr. Bennett in 1-2 weeks  You underwent a coronary angiogram and should wait 3 days before returning to ordinary activities. Do not drive for 2 days. Consult your doctor before returning to vigorous activity. You may return to work in 3-5 days. The catheter from your groin/wrist was removed and you should remove the dressing in 24 hours. You may shower once the dressing is removed, but avoid baths, hot tubs, or swimming for 5 days to prevent infection. If you notice bleeding from the site, hardening and pain at the site, drainage or redness from the site, coolness/paleness of the extremity, swelling, or fever, please call [phone]. pharmacy.      SECONDARY DISCHARGE DIAGNOSES  Diagnosis: Hyperlipidemia  Assessment and Plan of Treatment: YOur cholesterol is controlled.  Continue taking crestor 40mg daily    Diagnosis: Hypertension  Assessment and Plan of Treatment: Continue to take HCTZ daily.  Given coronary artery disease please STARt taking metoprolol xL 25mg daily.    Diagnosis: Diabetes  Assessment and Plan of Treatment: YOur diabetes is well controlled.  please HOLD your metformin for two days after the procedure becuase of the contrast dye, you can restart on 3/15.

## 2021-03-13 NOTE — DISCHARGE NOTE PROVIDER - NSDCMRMEDTOKEN_GEN_ALL_CORE_FT
Aspirin Enteric Coated 81 mg oral delayed release tablet: 1 tab(s) orally once a day  hydroCHLOROthiazide 25 mg oral tablet: 1 tab(s) orally once a day  metFORMIN 500 mg oral tablet, extended release: 1 tab(s) orally once a day  rosuvastatin 40 mg oral tablet: 1 tab(s) orally once a day  Vitamin D2 50,000 intl units (1.25 mg) oral capsule: 1 cap(s) orally once a week   Aspirin Enteric Coated 81 mg oral delayed release tablet: 1 tab(s) orally once a day  clopidogrel 75 mg oral tablet: 1 tab(s) orally once a day  hydroCHLOROthiazide 25 mg oral tablet: 1 tab(s) orally once a day  metFORMIN 500 mg oral tablet, extended release: 1 tab(s) orally once a day  Metoprolol Succinate ER 25 mg oral tablet, extended release: 1 tab(s) orally once a day   rosuvastatin 40 mg oral tablet: 1 tab(s) orally once a day  Vitamin D2 50,000 intl units (1.25 mg) oral capsule: 1 cap(s) orally once a week

## 2021-03-13 NOTE — DISCHARGE NOTE NURSING/CASE MANAGEMENT/SOCIAL WORK - PATIENT PORTAL LINK FT
You can access the FollowMyHealth Patient Portal offered by [facility] by registering at the following website: [email or web address]. By joining [facility]’s FollowMyHealth portal, you will also be able to view your health information using other applications (apps) compatible with our system.

## 2021-03-13 NOTE — DISCHARGE NOTE PROVIDER - HOSPITAL COURSE
76 y/o female, former smoker (1 PPD x 20 years) w/ FHx CAD (daughter has stents) and PMHx HTN, HLD, DM, sarcoidosis, arthritis, gastritis, vertigo, and vitamin D deficiency who presents for cardiac catheterization w/ possible intervention if clinically indicated in light of patient's risk factors, anginal equivalent symptoms, and abnormal NST results.     [ ] s/p cardiac cath 3/12/2021: CSI atherectomy/PCI JOLIE pLAD. L dominant. LM normal. LAD prox 75% iFR 0.9. LCx/OM mild luminal irregularities. RCA small, normal. EF 55%. EDP 4. No AS. R radial TR @ 10pm. R groin AS      Event overnight at 12am: PA went to check on patient at 12am for radial band removal and pt endorsed pain in her right groin site. PA observed a right femoral artery hematoma which was compressed. Distal pulses unchanged from prior, Right +2 DP and +doppler PT. During compression SBPs decreased from 140s to 90s and 250cc bolus given and less pressure was applied to groin. Once compression compete pt developed chest burning and bradycardia to 40s. Atropine 1mg x 1 given as well as an additional 500cc bolus. EKG unchanged from prior EKG. Blood glucose 99. Pt given Maalox with relief of chest burning. Pt's SBP increased to 140s and HR increased to 80s. CBC and Type and screen x 2 sent. Pt now resting comfortably in bed and sleeping.   76 y/o female, former smoker (1 PPD x 20 years) w/ FHx CAD (daughter has stents) and PMHx HTN, HLD, DM, sarcoidosis, arthritis, gastritis, vertigo, and vitamin D deficiency who presents for cardiac catheterization w/ possible intervention if clinically indicated in light of patient's risk factors, anginal equivalent symptoms, and abnormal NST results.     Patient is s/p cardiac cath 3/12/2021: CSI atherectomy/PCI JOLIE pLAD. L dominant. LM normal. LAD prox 75% iFR 0.9. LCx/OM mild luminal irregularities. RCA small, normal. EF 55%. EDP 4. No AS.  Admitted post Cath for monitoring.     Right radial band removed and access site stable with no bleeding, hematoma, and pulses intact.  Right groin angioseal site complicated by hematoma which was compressed. Distal pulses unchanged from prior, Right +2 DP and +doppler PT. During compression SBPs decreased from 140s to 90s and 250cc bolus given and less pressure was applied to groin. Once compression compete pt developed chest burning and bradycardia to 40s. Atropine 1mg x 1 given as well as an additional 500cc bolus. EKG unchanged from prior EKG. Blood glucose 99. Pt given Maalox with relief of chest burning. Pt's SBP increased to 140s and HR increased to 80s. CBC and Type and screen x 2 sent and stable  Patient remained HD stable and feeling well and ambulating in the morning.  Patient started on Toprol XL 25mg daily.  Labs, Vitals, Meds reviewed with Dr. Jimenes and patient deemed stable for discharge home.  patient will follow up with dr. Wiseman in 1-2 weeks.  New medications are E prescribed to pharmacy of choice.  All discharge instructions reviewed with patient.

## 2021-03-13 NOTE — CHART NOTE - NSCHARTNOTEFT_GEN_A_CORE
PA went to check on patient at 12am for radial band removal and pt endorsed pain in her right groin site. PA observed a right femoral artery hematoma which was compressed. Distal pulses unchanged from prior, Right +2 DP and +doppler PT. During compression SBPs decreased from 140s to 90s and 250cc bolus given and less pressure was applied to groin. Once compression compete pt developed chest burning and bradycardia to 40s. Atropine 1mg x 1 given as well as an additional 500cc bolus. EKG unchanged from prior EKG. Blood glucose 99. Pt given Maalox with relief of chest burning. Pt's SBP increased to 140s and HR increased to 80s. CBC and Type and screen x 2 sent. Pt now resting comfortably in bed and sleeping.

## 2021-03-16 PROBLEM — H26.9 UNSPECIFIED CATARACT: Chronic | Status: ACTIVE | Noted: 2021-03-10

## 2021-03-16 PROBLEM — K29.70 GASTRITIS, UNSPECIFIED, WITHOUT BLEEDING: Chronic | Status: ACTIVE | Noted: 2021-03-10

## 2021-03-16 PROBLEM — R42 DIZZINESS AND GIDDINESS: Chronic | Status: ACTIVE | Noted: 2021-03-10

## 2021-03-16 PROBLEM — E11.9 TYPE 2 DIABETES MELLITUS WITHOUT COMPLICATIONS: Chronic | Status: ACTIVE | Noted: 2021-03-10

## 2021-03-16 PROBLEM — D86.9 SARCOIDOSIS, UNSPECIFIED: Chronic | Status: ACTIVE | Noted: 2021-03-10

## 2021-03-16 PROBLEM — E55.9 VITAMIN D DEFICIENCY, UNSPECIFIED: Chronic | Status: ACTIVE | Noted: 2021-03-10

## 2021-03-16 PROBLEM — E78.5 HYPERLIPIDEMIA, UNSPECIFIED: Chronic | Status: ACTIVE | Noted: 2021-03-10

## 2021-03-19 DIAGNOSIS — R94.39 ABNORMAL RESULT OF OTHER CARDIOVASCULAR FUNCTION STUDY: ICD-10-CM

## 2021-03-19 DIAGNOSIS — E78.5 HYPERLIPIDEMIA, UNSPECIFIED: ICD-10-CM

## 2021-03-19 DIAGNOSIS — D86.9 SARCOIDOSIS, UNSPECIFIED: ICD-10-CM

## 2021-03-19 DIAGNOSIS — I49.1 ATRIAL PREMATURE DEPOLARIZATION: ICD-10-CM

## 2021-03-19 DIAGNOSIS — M19.90 UNSPECIFIED OSTEOARTHRITIS, UNSPECIFIED SITE: ICD-10-CM

## 2021-03-19 DIAGNOSIS — I25.110 ATHEROSCLEROTIC HEART DISEASE OF NATIVE CORONARY ARTERY WITH UNSTABLE ANGINA PECTORIS: ICD-10-CM

## 2021-03-19 DIAGNOSIS — Z82.49 FAMILY HISTORY OF ISCHEMIC HEART DISEASE AND OTHER DISEASES OF THE CIRCULATORY SYSTEM: ICD-10-CM

## 2021-03-19 DIAGNOSIS — R00.1 BRADYCARDIA, UNSPECIFIED: ICD-10-CM
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
## 2021-03-19 DIAGNOSIS — E11.9 TYPE 2 DIABETES MELLITUS WITHOUT COMPLICATIONS: ICD-10-CM

## 2021-03-19 DIAGNOSIS — I97.638 POSTPROCEDURAL HEMATOMA OF A CIRCULATORY SYSTEM ORGAN OR STRUCTURE FOLLOWING OTHER CIRCULATORY SYSTEM PROCEDURE: ICD-10-CM

## 2021-03-19 DIAGNOSIS — Z79.82 LONG TERM (CURRENT) USE OF ASPIRIN: ICD-10-CM

## 2021-03-19 DIAGNOSIS — I10 ESSENTIAL (PRIMARY) HYPERTENSION: ICD-10-CM

## 2021-03-19 DIAGNOSIS — Z79.84 LONG TERM (CURRENT) USE OF ORAL HYPOGLYCEMIC DRUGS: ICD-10-CM

## 2021-03-19 DIAGNOSIS — Z87.891 PERSONAL HISTORY OF NICOTINE DEPENDENCE: ICD-10-CM
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
## 2021-03-22 ENCOUNTER — APPOINTMENT (OUTPATIENT)
Dept: HEART AND VASCULAR | Facility: CLINIC | Age: 76
End: 2021-03-22
Payer: COMMERCIAL

## 2021-03-22 VITALS
HEIGHT: 63 IN | BODY MASS INDEX: 35.08 KG/M2 | WEIGHT: 198 LBS | HEART RATE: 59 BPM | SYSTOLIC BLOOD PRESSURE: 126 MMHG | OXYGEN SATURATION: 97 % | DIASTOLIC BLOOD PRESSURE: 62 MMHG

## 2021-03-22 VITALS — TEMPERATURE: 97.1 F

## 2021-03-22 DIAGNOSIS — Z92.89 PERSONAL HISTORY OF OTHER MEDICAL TREATMENT: ICD-10-CM

## 2021-03-22 PROCEDURE — 99072 ADDL SUPL MATRL&STAF TM PHE: CPT

## 2021-03-22 PROCEDURE — 99213 OFFICE O/P EST LOW 20 MIN: CPT | Mod: 25

## 2021-03-22 PROCEDURE — 93000 ELECTROCARDIOGRAM COMPLETE: CPT

## 2021-03-22 RX ORDER — AMOXICILLIN 500 MG/1
500 CAPSULE ORAL
Qty: 30 | Refills: 0 | Status: COMPLETED | COMMUNITY
Start: 2020-12-28

## 2021-03-22 NOTE — PHYSICAL EXAM
[General Appearance - Well Developed] : well developed [Normal Appearance] : normal appearance [Well Groomed] : well groomed [General Appearance - Well Nourished] : well nourished [] : no respiratory distress [Respiration, Rhythm And Depth] : normal respiratory rhythm and effort [Exaggerated Use Of Accessory Muscles For Inspiration] : no accessory muscle use [Auscultation Breath Sounds / Voice Sounds] : lungs were clear to auscultation bilaterally [Heart Rate And Rhythm] : heart rate and rhythm were normal [Heart Sounds] : normal S1 and S2 [Edema] : no peripheral edema present [Veins - Varicosity Changes] : no varicosital changes were noted in the lower extremities [Abdomen Soft] : soft [Abdomen Tenderness] : non-tender [Abnormal Walk] : normal gait [Oriented To Time, Place, And Person] : oriented to person, place, and time [Impaired Insight] : insight and judgment were intact [Affect] : the affect was normal [Mood] : the mood was normal [FreeTextEntry1] : Insertion site over right femoral with some mild bruising with negative open wound, flucutance, or tenderness. Hardened mass about 1.5 cm across

## 2021-03-22 NOTE — HISTORY OF PRESENT ILLNESS
[FreeTextEntry1] : 75 year old female with PMHX of CAD ( JOLIE to pLAD, EF 55% 03/12/21), HTN, HLD, preDM and sarcoidosis here for follow up recent cardiac catheterization.\par \par Since last visit she is following overall okay. She was discharged 1 week ago. She has been hesitant to walk but was able to walk yesterday about 2.5 blcoks without complication. She denies any chest pains, shortness of breath, palpitations, leg swelling, PND or orthopnea.\par \par She has stated that she her "dizziness " has improved. She only had it once since last seen. Her dizziness is reported as a " swaying " sensation in her head with no associated palpitations or syncope. She has been on long term Meclizine in the past and was recently told to follow up witn ENT however she has not gone yet.\par \par She denies any bleeding complications. She remains to have some hyperpigmentation over right femoral insertion site without pain. She has misread her discharge paperwork and accidentally did not take her statins for one week. She plans to restart today. \par \par

## 2021-03-22 NOTE — DISCUSSION/SUMMARY
[FreeTextEntry1] : 75 year old female with PMHX of CAD ( JOLIE to pLAD, EF 55% 03/12/21), HTN, HLD, preDM and sarcoidosis here for follow up recent cardiac catheterization.\par \par CAD: Currently asymptomatic. Cardiac Cath JOLIE to pLAD, EF 55% done 03/12/21. Continue with ASA, Plavix, BBlocker and Statin with LDL goal of <70.\par HTN: Controlled. Continue with Metoprolol 25mg QD and HCTZ 25mg QD\par HLD: Patient was inadvertently not compliant. She will restart the Rosuvastatin 40mg and recheck at next visit. \par Sarcoidosis: Currently asymptomatic. No signs of arrhythmia or heart block on EKG or recent Holter. If she becomes symptomatic, consider PET Scan vs Cardiac MRI. \par \par Follow up in 2 months with blood work .  I have discussed the case with ADÁN Leyva. I have personally performed a history, physical exam, and my own medical decision making. I have reviewed the note and agree with the findings and plan with the following additions: The patient has not had symptoms of sarcoidosis for over 8 years and has not been on prednisone.  She had had chest pain and dyspnea with her episodes.  She had a chest x-ray last year.  The patient has no atrial fibrillation or conduction disease.  There is no clinical evidence for cardiac sarcoidosis.  Her coronary disease is as above.

## 2021-03-22 NOTE — REVIEW OF SYSTEMS
[Change In Color Of Skin] : change in skin color [Dizziness] : dizziness [Fever] : no fever [Headache] : no headache [Chills] : no chills [Shortness Of Breath] : no shortness of breath [Dyspnea on exertion] : not dyspnea during exertion [Chest  Pressure] : no chest pressure [Chest Pain] : no chest pain [Lower Ext Edema] : no extremity edema [Leg Claudication] : no intermittent leg claudication [Palpitations] : no palpitations [Cough] : no cough [Abdominal Pain] : no abdominal pain [Nausea] : no nausea [Heartburn] : no heartburn [Skin: A Rash] : no rash: [Easy Bleeding] : no tendency for easy bleeding [Easy Bruising] : no tendency for easy bruising

## 2021-03-30 PROCEDURE — C1887: CPT

## 2021-03-30 PROCEDURE — 82550 ASSAY OF CK (CPK): CPT

## 2021-03-30 PROCEDURE — C1725: CPT

## 2021-03-30 PROCEDURE — 86900 BLOOD TYPING SEROLOGIC ABO: CPT

## 2021-03-30 PROCEDURE — 83036 HEMOGLOBIN GLYCOSYLATED A1C: CPT

## 2021-03-30 PROCEDURE — 85025 COMPLETE CBC W/AUTO DIFF WBC: CPT

## 2021-03-30 PROCEDURE — 82553 CREATINE MB FRACTION: CPT

## 2021-03-30 PROCEDURE — C1760: CPT

## 2021-03-30 PROCEDURE — C1769: CPT

## 2021-03-30 PROCEDURE — 85610 PROTHROMBIN TIME: CPT

## 2021-03-30 PROCEDURE — 86850 RBC ANTIBODY SCREEN: CPT

## 2021-03-30 PROCEDURE — C1874: CPT

## 2021-03-30 PROCEDURE — 80061 LIPID PANEL: CPT

## 2021-03-30 PROCEDURE — 36415 COLL VENOUS BLD VENIPUNCTURE: CPT

## 2021-03-30 PROCEDURE — 80053 COMPREHEN METABOLIC PANEL: CPT

## 2021-03-30 PROCEDURE — 85730 THROMBOPLASTIN TIME PARTIAL: CPT

## 2021-03-30 PROCEDURE — C1724: CPT

## 2021-03-30 PROCEDURE — 83735 ASSAY OF MAGNESIUM: CPT

## 2021-03-30 PROCEDURE — C1894: CPT

## 2021-03-30 PROCEDURE — 80048 BASIC METABOLIC PNL TOTAL CA: CPT

## 2021-03-30 PROCEDURE — 82962 GLUCOSE BLOOD TEST: CPT

## 2021-03-30 PROCEDURE — 86901 BLOOD TYPING SEROLOGIC RH(D): CPT

## 2021-03-30 PROCEDURE — 85027 COMPLETE CBC AUTOMATED: CPT

## 2021-04-06 ENCOUNTER — APPOINTMENT (OUTPATIENT)
Dept: HEART AND VASCULAR | Facility: CLINIC | Age: 76
End: 2021-04-06

## 2021-08-23 ENCOUNTER — RX RENEWAL (OUTPATIENT)
Age: 76
End: 2021-08-23

## 2021-12-02 ENCOUNTER — NON-APPOINTMENT (OUTPATIENT)
Age: 76
End: 2021-12-02

## 2021-12-02 ENCOUNTER — APPOINTMENT (OUTPATIENT)
Dept: HEART AND VASCULAR | Facility: CLINIC | Age: 76
End: 2021-12-02
Payer: COMMERCIAL

## 2021-12-02 VITALS
HEIGHT: 63 IN | SYSTOLIC BLOOD PRESSURE: 133 MMHG | BODY MASS INDEX: 33.66 KG/M2 | WEIGHT: 190 LBS | HEART RATE: 79 BPM | TEMPERATURE: 97.3 F | OXYGEN SATURATION: 96 % | DIASTOLIC BLOOD PRESSURE: 75 MMHG

## 2021-12-02 DIAGNOSIS — Z23 ENCOUNTER FOR IMMUNIZATION: ICD-10-CM

## 2021-12-02 PROCEDURE — 36415 COLL VENOUS BLD VENIPUNCTURE: CPT

## 2021-12-02 PROCEDURE — 99214 OFFICE O/P EST MOD 30 MIN: CPT | Mod: 25

## 2021-12-02 PROCEDURE — 93000 ELECTROCARDIOGRAM COMPLETE: CPT

## 2021-12-02 NOTE — PHYSICAL EXAM
[Well Developed] : well developed [Well Nourished] : well nourished [No Acute Distress] : no acute distress [Normal Conjunctiva] : normal conjunctiva [No Carotid Bruit] : no carotid bruit [Normal S1, S2] : normal S1, S2 [No Murmur] : no murmur [Clear Lung Fields] : clear lung fields [Good Air Entry] : good air entry [No Respiratory Distress] : no respiratory distress  [No Edema] : no edema [Normal PT B/L] : normal PT B/L [Normal DP B/L] : normal DP B/L [Moves all extremities] : moves all extremities [No Focal Deficits] : no focal deficits [Normal Speech] : normal speech [Alert and Oriented] : alert and oriented [Normal memory] : normal memory

## 2021-12-07 LAB
ANION GAP SERPL CALC-SCNC: 16 MMOL/L
BUN SERPL-MCNC: 15 MG/DL
CALCIUM SERPL-MCNC: 9.3 MG/DL
CHLORIDE SERPL-SCNC: 103 MMOL/L
CHOLEST SERPL-MCNC: 212 MG/DL
CO2 SERPL-SCNC: 22 MMOL/L
CREAT SERPL-MCNC: 0.81 MG/DL
ESTIMATED AVERAGE GLUCOSE: 120 MG/DL
GLUCOSE SERPL-MCNC: 100 MG/DL
HBA1C MFR BLD HPLC: 5.8 %
HCT VFR BLD CALC: 37.6 %
HDLC SERPL-MCNC: 81 MG/DL
HGB BLD-MCNC: 12 G/DL
LDLC SERPL CALC-MCNC: 119 MG/DL
NONHDLC SERPL-MCNC: 131 MG/DL
POTASSIUM SERPL-SCNC: 3.6 MMOL/L
SODIUM SERPL-SCNC: 141 MMOL/L
TRIGL SERPL-MCNC: 61 MG/DL

## 2021-12-07 NOTE — DISCUSSION/SUMMARY
[FreeTextEntry1] : 75 year old female with PMHX of CAD ( JOLIE to pLAD, EF 55% 03/12/21), HTN, HLD, preDM and sarcoidosis here for follow up recent cardiac catheterization.\par \par CAD: Atypical chest discomfort noted at rest. EKG SR 79 with non specific ST depression, similar to history. Given recent cardiac cath, significant CAD not likely. Continue with ASA, Plavix, BBlocker and Statin with LDL goal of <70.\par HTN: Controlled. Continue with Metoprolol 25mg QD and HCTZ 25mg QD\par HLD: Fasting labs sent today. LDL goal < 70. Restart Rosuvastatin 40mg. \par Sarcoidosis: Given history of abnormal wall motion, will send for Holter monitor. Consider additional imaging if abnormalities noted. \par Unbalanced / Falls: Follow up with PCP\par \par Return for echocardiogram\par

## 2021-12-07 NOTE — HISTORY OF PRESENT ILLNESS
[FreeTextEntry1] : 76 year old female with PMHX of CAD ( JOLIE to pLAD, EF 55% 03/12/21), HTN, HLD, preDM and sarcoidosis here for follow up.\par \par She did report a left sided chest heaviness that radiated to her midsternum and epigastric area one month ago. Patient was laying in bed, at rest when symptoms occurred. She denied any associated palpitations, nausea or diaphoresis. She felt similar sensation when she was diagnosed with sarcoidosis. She can not recall is she had difficulty breathing. She took a Tylenol and it resolved in about one hour. It has not returned.\par \par She continues to walk regularly. She can walk [address] and [address] without any chest pains, dyspnea on exertion or return of chest heaviness. \par \par She has reported return of head swaying when ambulating. She denies any palpitations, syncope or chest pains. She feels she is off balance and feels she is tripping. She has had 3 - 4 mechanical falls in the past month. She denies any trauma to head or loss of conscious. \par \par She denies any bleeding complications. Since last visit, she has ran out of Metoprolol and Rosuvastatin for the past few months and has had any refills.\par \par

## 2021-12-07 NOTE — REVIEW OF SYSTEMS
[Chest Discomfort] : chest discomfort [Dizziness] : dizziness [Fever] : no fever [Headache] : no headache [Chills] : no chills [SOB] : no shortness of breath [Dyspnea on exertion] : not dyspnea during exertion [Lower Ext Edema] : no extremity edema [Leg Claudication] : no intermittent leg claudication [Palpitations] : no palpitations [Orthopnea] : no orthopnea [PND] : no PND [Syncope] : no syncope [Cough] : no cough [Abdominal Pain] : no abdominal pain [Nausea] : no nausea [Vomiting] : no vomiting [Diarrhea] : diarrhea [Numbness (Hypoesthesia)] : no numbness [Weakness] : no weakness [Speech Disturbance] : no speech disturbance [Easy Bleeding] : no tendency for easy bleeding

## 2022-01-11 NOTE — ED ADULT TRIAGE NOTE - AS HEIGHT TYPE
[facility]    HOSPITAL MEDICINE PROGRESS NOTE    Patient: Indio Edwards Today's Date: 1/11/2022   YOB: 1957 Admission Date: 9/27/2021  3:53 PM   MRN: [id number] Inpatient LOS: 106 day(s)   Room:  [place] [address] Hospital Day:  Hospital Day: 107       History and Subjective complaints       Hospital Course    Indio Edwards is a 64 year old male who presented on 9/27/2021 with complaints of Altered Mental Status and Stroke Alert    Per his roommate patient did not get up to eat or to use the bathroom since 9/23.  Further evaluation on admit revealed multifocal ischemia on CT brain.  MRI was significant for dense stroke with right-side hemiparesis and aphasia. Evaluated by Neurology.  Conservative treatment recommended.  Due to poor oral intake initially patient required TPN, this subsequently was discontinued.  Primary team had discussion with POA, code status changed to \"Do Not Resuscitate\".   Patient has been declining medical therapy, physical therapy.   At some point comfort care was discussed and ordered, but later appetite improved and patient started eating.    Patient evaluated today.  He was calm and cooperative.  Aphasix however able to communicate and follows commands with gesture and nodding.  He was drinking some fluids.  Denied having any pain.     Reviewed Pertinent Histories: Medical History, Surgical History, Social History, Family History,     ROS: Pertinent systems negative except as above.    Medications: Reviewed     Scheduled Medications:    potassium CHLORIDE, 40 mEq, Once  Potassium Standard Replacement Protocol, , See Admin Instructions  Magnesium Standard Replacement Protocol, , See Admin Instructions  aspirin, 81 mg, Daily  atorvastatin, 40 mg, Nightly  mirtazapine, 15 mg, Nightly  amLODIPine, 2.5 mg, Daily  lactulose, 20 g, Daily  docusate sodium-sennosides, 3 tablet, Nightly  polyethylene glycol, 17 g, BID      Continuous Infusions:    PRN Medications:  diclofenac, sodium  chloride, lactulose, morphine injection, oxyCODONE (IMM REL), labetalol, hydrALAZINE, bisacodyl, dextrose, dextrose, glucagon, dextrose, dextrose, acetaminophen **OR** acetaminophen      Physical Examination       Vital 24 Hour Range Most Recent Value   Temperature Temp  Min: 98 °F (36.7 °C)  Max: 101.4 °F (38.6 °C) 98.4 °F (36.9 °C)   Pulse Pulse  Min: 74  Max: 113 80   Respiratory Resp  Min: 16  Max: 24 16   Blood Pressure BP  Min: 122/65  Max: 159/78 122/65   Pulse Oximetry SpO2  Min: 95 %  Max: 98 % 98 %   Arterial BP No data recorded     O2 No data recorded       Intake and Output:    No intake or output data in the 24 hours ending 01/11/22 1216    Last Stool Occurrence:  1 (01/09/22 0441)    Vital Most Recent Value First Value   Weight 70.4 kg (155 lb 3.3 oz) Weight: 77.4 kg (170 lb 9.6 oz)   Height 5' 4\" (162.6 cm) Height: 5' 4\" (162.6 cm)   BMI 26.64 N/A     General: Looks well.  Denies any pain.  CV:  S1-S2 regular  Resp:  Vesicular breath sounds bilaterally.  Abd:  Soft, nontender no palpable masses.  Ext:  Symmetric, no pitting edema bl LE.  Minimal soft tissue swelling right dorsal hand, sensitive on exam.  I do not appreciate any localized in duration.  No open lesions/ulcers.  Neuro:  Aphasic.  Dense right-side hemiparesis.      Test Results     Labs: The Laboratory values listed below have been reviewed and pertinent findings discussed in the Assessment and Plan.    Laboratory values:   Recent Labs   Lab 01/11/22  0613   WBC 4.9   HGB 12.7*   HCT 37.7*            Recent Labs   Lab 01/11/22  0613   SODIUM 140   CHLORIDE 106   CO2 26   BUN 6   CREATININE 0.44*   CALCIUM 8.5   ALBUMIN 3.1*   BILIRUBIN 0.5   ALKPT 61   GPT 56   AST 50*   GLUCOSE 97          Radiology: Imaging studies have been reviewed and pertinent findings discussed in the Assessment and Plan.    No results found for any visits on 09/27/21 (from the past 48 hour(s)).    Tubes, Devices, Monitoring     Telemetry: Off    Henry:  No    Assessment and Plan     1. This is a 64-year-old male who was admitted on 9/27 with acute left-sided MCA ischemic stroke and residual right-sided hemiparesis and aphasia.  Evaluated by Neurology.  MRI brain remarkable for large infarct in MCA territory on L side, CT neck remarkable for high grade stenosis of right PICA. LKWT >4.5 hours, not candidate for tPA.  Since patient is no longer comfort care.  restarted ASA/ Lipitor.      2. Dysphagia with poor oral intake,  Initially required TPN, improved.    Eating now.  - speech for reevaluation    3. Hypertensive urgency on admission, resolved  4. Hypertension, not at goal.  But  patient has been refusing medications.  - low-dose amlodipine.    5. Hypokalemia/ hypomagnesemia, replaced.  - will start patient on daily supplementation X 5 days.    6. Constipation.  Patient has been refusing stool softer/suppository.  This was discussed with POA by prior attending physician.  POA agreeable with lactulose for constipation prevention.  Patient continues to refuse medication.      7. Depression???  Difficult to assess since patient is nonverbal.  I will increase dose of mirtazapine 15 mg at night, as long as he's agreeable to take it.    8. Minimal soft tissue right hand edema.  Patient had no IV for long time, and especially no IVs or blood drawn on the right side.  Likely positional.  - Hand elevation, topical icing, topical Voltaren gel and p.r.n. Tylenol.    9. Activated POA.  \"Do Not Resuscitate\"  Patient continues to refuse medications, physical therapy.  Discussed with POA over the phone, agreed with plan: will try to obtain basic blood work and start patient on BP medication, aspirin and Lipitor as long as he is agreeable.    10. C/o dizziness, patient is aphasic, not sure how much to rely on his head nodding to questions, will check orthostatics, discussed with nursing staff    Consults:    IP CONSULT TO NEUROLOGY  IP CONSULT TO PHYSICAL MEDICINE & REHAB  IP  CONSULT TO RN WOUND CARE  IP CONSULT TO NUTRITION SERVICES  IP CONSULT TO SOCIAL WORK  IP CONSULT TO PSYCHIATRY  IP CONSULT TO NUTRITIONAL SERVICES - TUBE FEEDING  IP CONSULT TO PALLIATIVE CARE  IP CONSULT TO NUTRITIONAL SERVICES - PN  IP CONSULT TO NEUROPSYCHOLOGY    Diet:  One Time Diet Dysphagia/mech Soft; Speech Trial Tray (10/20); Please Send: Scrambled Eggs (salt/pepper), Polish Toast (butter/syrup), Grd Sausage/gravy, Ice Cream, Coffee (cream/sugar), Orange Juice (2) Please Send With Ace Trays At 0800. Thanks! ...  Regular Diet  Standard Tray From Dietary(no Room Svc) Continuous  Nursing To Pass Tray - Periodic Supervision  Therapy Orders:    PT and OT Orders Placed this Encounter   Procedures   • Occupational Therapy   • Occupational Therapy   • Occupational Therapy   • Occupational Therapy   • Physical Therapy   • Physical Therapy   • Physical Therapy   • Physical Therapy       Smoking status- current smoker    Nutrition status- inadequate intake  Body mass index is 26.64 kg/m². -appropriate weight BMI 18.5-24  DVT Prophylaxis -comfort measures.       Advanced Directives     Code Status: Do Not Resuscitate       Discharge Plan     The patients treatment plans were discussed with patient.     Recommendations for Discharge   SW 24 Hour assist,SNF.   PT  (Supportive living environment)   OT Post acute therapy,[facility]   SLP Continued speech therapy     Anticipated discharge destination:  Discussed with the , patient is accepted at group home facility, requires bed and wheelchair aand home care on discharge, documented sent to [facility]    Barriers to Discharge:  Placement in progress    Jose E Monge MD    [facility] Hospitalist  [phone]  1/11/2022  12:16 PM    stated

## 2022-02-18 ENCOUNTER — RX RENEWAL (OUTPATIENT)
Age: 77
End: 2022-02-18

## 2022-02-24 ENCOUNTER — NON-APPOINTMENT (OUTPATIENT)
Age: 77
End: 2022-02-24

## 2022-02-24 ENCOUNTER — APPOINTMENT (OUTPATIENT)
Dept: HEART AND VASCULAR | Facility: CLINIC | Age: 77
End: 2022-02-24
Payer: COMMERCIAL

## 2022-02-24 ENCOUNTER — RX RENEWAL (OUTPATIENT)
Age: 77
End: 2022-02-24

## 2022-02-24 VITALS
DIASTOLIC BLOOD PRESSURE: 76 MMHG | SYSTOLIC BLOOD PRESSURE: 129 MMHG | HEIGHT: 63 IN | WEIGHT: 190 LBS | HEART RATE: 70 BPM | TEMPERATURE: 96.4 F | BODY MASS INDEX: 33.66 KG/M2 | OXYGEN SATURATION: 94 %

## 2022-02-24 PROCEDURE — 99214 OFFICE O/P EST MOD 30 MIN: CPT | Mod: 25

## 2022-02-24 PROCEDURE — 93000 ELECTROCARDIOGRAM COMPLETE: CPT

## 2022-02-25 ENCOUNTER — APPOINTMENT (OUTPATIENT)
Dept: HEART AND VASCULAR | Facility: CLINIC | Age: 77
End: 2022-02-25

## 2022-03-02 NOTE — DISCUSSION/SUMMARY
[FreeTextEntry1] : 76 year old female with PMHX of CAD ( JOLIE to pLAD, EF 55% 03/12/21), HTN, HLD, preDM and sarcoidosis here for follow up.\par \par CAD: Atypical chest discomfort noted at rest. PEDRO without change.  EKG SR 73 with non specific ST depression, similar to history. Given recent cardiac cath, significant CAD not likely. Continue with ASA, Plavix, BBlocker and Statin with LDL goal of <70.\par HTN: Controlled. Will switch HCTZ to Lisinopril for ACE coverage. Continue with Metoprolol 25mg. \par HLD: URGED medication compliance with Statin. Will recheck labs next visit. LDL Goal < 70\par Leg Pain: Long standing. Will have patient return for venous ultrasound. \par Dizziness: Not likely cardiac. Follow up with PCP\par \par Return for venous US and follow up in 2 months with echocardiogram. \par  I have discussed the case with ADÁN Leyva. I have personally performed a history, physical exam, and my own medical decision making. I have reviewed the note and agree with the findings and plan.  Change in medication as above.\par

## 2022-03-02 NOTE — PHYSICAL EXAM
[Well Developed] : well developed [Well Nourished] : well nourished [No Acute Distress] : no acute distress [No Carotid Bruit] : no carotid bruit [No Edema] : no edema [Moves all extremities] : moves all extremities [No Focal Deficits] : no focal deficits [Alert and Oriented] : alert and oriented [Normal memory] : normal memory [de-identified] : systolic 1/6 murmur heard best at base [de-identified] : left lateral calf tenderness noted without swelling, heat or discoloration.

## 2022-03-02 NOTE — REVIEW OF SYSTEMS
[Chest Discomfort] : chest discomfort [Dizziness] : dizziness [Fever] : no fever [Chills] : no chills [SOB] : no shortness of breath [Dyspnea on exertion] : not dyspnea during exertion [Lower Ext Edema] : no extremity edema [Leg Claudication] : no intermittent leg claudication [Palpitations] : no palpitations [Orthopnea] : no orthopnea [PND] : no PND [Syncope] : no syncope [Nausea] : no nausea [Vomiting] : no vomiting [Heartburn] : no heartburn [Diarrhea] : diarrhea [Numbness (Hypoesthesia)] : no numbness [Weakness] : no weakness [Speech Disturbance] : no speech disturbance

## 2022-03-02 NOTE — HISTORY OF PRESENT ILLNESS
[FreeTextEntry1] : 76 year old female with PMHX of CAD ( JOLIE to pLAD, EF 55% 03/12/21), HTN, HLD, preDM and sarcoidosis here for follow up.\par \par Since last visit, she has not restarted her statin medication. She has 2 types of chest pains since last visit. First is midsternal chest heaviness, during rest and lasting only a few seconds. No associated symptoms. She has been active without any return. Second, left lateral ache. She has noticed waxing and waning over the course of two days. Again not exertional and better with Tylenol. She attribute this to possibly lifting a heavy object. \par \par She continues to walk about [address] and [address] regularly. She does admit to stopping about half way from dyspnea on exertion but she reports no chest discomfort. PEDRO is long standing with no changes. She reports left leg swelling but no PND or orthopnea. \par \par She feels she has had long standing left leg pains for many years. She had a Venous US 11/27/\par \par Also, she has cut her HTCZ medication in half in fear her dizziness might be secondary to hypotension. She has felt no changes in her dizziness. She occasionally notices light headedness and head swaying to one side if she sudden movement or talking to fast occasionally noted with buzzing of her left ear. She denies any associated palpitations, shortness of breath or syncope. She has felt this for at least 10 years. She now walks with a cane to assist with balance. She no longer is falling. She has gone for ENT in the past but has not finished her evaluation.

## 2022-03-08 RX ORDER — LISINOPRIL 10 MG/1
10 TABLET ORAL DAILY
Qty: 90 | Refills: 2 | Status: DISCONTINUED | COMMUNITY
Start: 2022-02-24 | End: 2022-03-08

## 2022-03-11 DIAGNOSIS — Z95.5 PRESENCE OF CORONARY ANGIOPLASTY IMPLANT AND GRAFT: ICD-10-CM

## 2022-03-13 ENCOUNTER — RX RENEWAL (OUTPATIENT)
Age: 77
End: 2022-03-13

## 2022-03-22 ENCOUNTER — APPOINTMENT (OUTPATIENT)
Dept: HEART AND VASCULAR | Facility: CLINIC | Age: 77
End: 2022-03-22
Payer: COMMERCIAL

## 2022-03-22 DIAGNOSIS — M79.606 PAIN IN LEG, UNSPECIFIED: ICD-10-CM

## 2022-03-22 PROCEDURE — 93970 EXTREMITY STUDY: CPT

## 2022-03-23 PROBLEM — M79.606 LEG PAIN, ANTERIOR: Status: RESOLVED | Noted: 2020-02-05 | Resolved: 2022-03-23

## 2022-03-27 ENCOUNTER — RX RENEWAL (OUTPATIENT)
Age: 77
End: 2022-03-27

## 2022-04-01 ENCOUNTER — APPOINTMENT (OUTPATIENT)
Dept: INTERNAL MEDICINE | Facility: CLINIC | Age: 77
End: 2022-04-01
Payer: COMMERCIAL

## 2022-04-01 VITALS
WEIGHT: 190 LBS | HEIGHT: 63 IN | DIASTOLIC BLOOD PRESSURE: 60 MMHG | SYSTOLIC BLOOD PRESSURE: 116 MMHG | OXYGEN SATURATION: 99 % | HEART RATE: 67 BPM | TEMPERATURE: 98.5 F | BODY MASS INDEX: 33.66 KG/M2

## 2022-04-01 DIAGNOSIS — Z00.00 ENCOUNTER FOR GENERAL ADULT MEDICAL EXAMINATION W/OUT ABNORMAL FINDINGS: ICD-10-CM

## 2022-04-01 DIAGNOSIS — E55.9 VITAMIN D DEFICIENCY, UNSPECIFIED: ICD-10-CM

## 2022-04-01 DIAGNOSIS — R05.9 COUGH, UNSPECIFIED: ICD-10-CM

## 2022-04-01 DIAGNOSIS — Z80.6 FAMILY HISTORY OF LEUKEMIA: ICD-10-CM

## 2022-04-01 DIAGNOSIS — Z80.3 FAMILY HISTORY OF MALIGNANT NEOPLASM OF BREAST: ICD-10-CM

## 2022-04-01 DIAGNOSIS — M79.662 PAIN IN LEFT LOWER LEG: ICD-10-CM

## 2022-04-01 PROCEDURE — 36415 COLL VENOUS BLD VENIPUNCTURE: CPT

## 2022-04-01 PROCEDURE — 99397 PER PM REEVAL EST PAT 65+ YR: CPT | Mod: 25

## 2022-04-01 PROCEDURE — 99214 OFFICE O/P EST MOD 30 MIN: CPT | Mod: 25

## 2022-04-01 NOTE — PLAN
[FreeTextEntry1] : HCM:\par - Pt received referral for mammo, colonoscopy, and DEXA scan\par - Pt states she will get Shingrix at local pharmacy, declined flu vaccine\par - annual labs sent\par \par Sarcoidosis\par - Pt sent referral for pulmonology, ophthalmologist, and GI\par - Pt to have CXR \par - labs for ACE level\par \par Leg pain\par - Reviewed LE doppler findings and reassured pt\par \par SOB\par - Pt will see cardiologist the end of the month\par - Pt's exercise tolerance is stable. Pt to continue activity as tolerated\par - instructed to notify office if her SOB worsens\par \par CP\par - reproducible on PE and non exertional\par - reassured pt and she will be f/u with cardiologist in 1 month

## 2022-04-01 NOTE — HEALTH RISK ASSESSMENT
[Assistive Device] : Patient uses an assistive device [0] : 2) Feeling down, depressed, or hopeless: Not at all (0) [PHQ-2 Negative - No further assessment needed] : PHQ-2 Negative - No further assessment needed [Patient reported mammogram was normal] : Patient reported mammogram was normal [Two or more falls in past year] : Patient reported two or more falls in the past year [HIV test declined] : HIV test declined [Hepatitis C test offered] : Hepatitis C test offered [de-identified] : Cane [[id number]] : 0 [MammogramDate] : 2019 [MammogramComments] : Sent referral for mammo [BoneDensityComments] : sent referral for DEXA [ColonoscopyComments] : sent referral for colonoscopy [HepatitisCDate] : 05/17

## 2022-04-01 NOTE — HISTORY OF PRESENT ILLNESS
[FreeTextEntry1] : annual  [de-identified] : Pt is a 75 y/o F with PMHx of CAD (PCI to LAD on 3/12/21), HLD, HTN, Pre-DM, Vit D insufficiency\par \par Pt states she has been having L leg pain that began yesterday. She states the pain originates by the ankle and travels up the lateral aspect of the leg up to her mid thigh.  The pain started while pt was sitting down. The pain that waxes and wanes with no specific pattern.  Describes pain as sharp and showed swelling. Pt had a b/l LE doppler which showed no evidence of thrombosis or venous insufficiency.\par \par Pt states she is able to walk about 2 blocks regularly. Pt states she will have to stop secondary to PEDRO. She states she does experience an occasional "chest discomfort" at rest but denies exertional symptoms. She states the CP is "aggravated by touch." The pain has been occurring "for years" and intermittently presents "every few months." She states last episode occurred 1 month ago and resolved after a day.  Denies exertional CP, palpitations, diaphoresis, or nausea.\par \par Pt states she has been having a chronic dry cough that will occasionally wake her up at night after starting Lisinopril on 2/24/22. Pt discussed this issue with her cardiologist Dr. Morris and he replaced the Lisinopril with HCTZ on 3/8/21. However, she still is experiencing a non-productive cough. \par \par Denies change in SOB, fever, chills, weight loss, night sweats, change in vision, diarrhea melena, hematochezia\par \par Cough\par - Last XR was November 2020\par \par CAD s/p PCI March 2021\par - Pt last nuclear stress test in March 2021\par - Pt on ASA and Plavix\par - Pt's HLD managed with Crestor 40 mg\par \par HTN\par - controlled on HCTZ and \par \par HLD\par - Pt takes rosuvastatin 40 mg \par \par Pre-DM\par - Pt takes metformin \par \par HCM\par - Pt states she received colonoscopy "a long time ago" and got a notice that she is overdue\par - Pt had mammogram in 2019 (FHx of breast CA- sister Dx'ed at 59 y/o)\par - Pt declined flu vaccine\par - Never had Shingles vaccine

## 2022-04-01 NOTE — PHYSICAL EXAM
[Examination Of The Breasts] : a normal appearance [No Masses] : no breast masses were palpable [Normal] : no rash [Axillary Lymph Nodes Enlarged Bilaterally] : no enlarged nodes [de-identified] : + tenderness to palpation over sternal border

## 2022-04-01 NOTE — END OF VISIT
[FreeTextEntry3] : I personally discussed this patient with the Physician Assistant at the time of the visit. I agree with the assessment and plan as written, unless noted below. \par I was present with the Physician Assistant during the key portions of the patient’s visit. I discussed the case with the Physician Assistant and agree with the findings and plan as documented in the Physician Assistant's note, unless noted below.

## 2022-04-05 ENCOUNTER — NON-APPOINTMENT (OUTPATIENT)
Age: 77
End: 2022-04-05

## 2022-04-05 LAB
25(OH)D3 SERPL-MCNC: 39.1 NG/ML
ACE BLD-CCNC: 69 U/L
ALBUMIN SERPL ELPH-MCNC: 4.4 G/DL
ALP BLD-CCNC: 74 U/L
ALT SERPL-CCNC: 10 U/L
ANION GAP SERPL CALC-SCNC: 14 MMOL/L
APPEARANCE: CLEAR
AST SERPL-CCNC: 24 U/L
BASOPHILS # BLD AUTO: 0.04 K/UL
BASOPHILS NFR BLD AUTO: 1.3 %
BILIRUB SERPL-MCNC: 0.4 MG/DL
BILIRUBIN URINE: NEGATIVE
BLOOD URINE: NEGATIVE
BUN SERPL-MCNC: 9 MG/DL
CALCIUM SERPL-MCNC: 9.8 MG/DL
CHLORIDE SERPL-SCNC: 102 MMOL/L
CHOLEST SERPL-MCNC: 134 MG/DL
CK SERPL-CCNC: 260 U/L
CO2 SERPL-SCNC: 24 MMOL/L
COLOR: YELLOW
CREAT SERPL-MCNC: 0.83 MG/DL
EGFR: 72 ML/MIN/1.73M2
EOSINOPHIL # BLD AUTO: 0.14 K/UL
EOSINOPHIL NFR BLD AUTO: 4.4 %
ESTIMATED AVERAGE GLUCOSE: 126 MG/DL
GLUCOSE QUALITATIVE U: NEGATIVE
GLUCOSE SERPL-MCNC: 112 MG/DL
HBA1C MFR BLD HPLC: 6 %
HCT VFR BLD CALC: 36.6 %
HDLC SERPL-MCNC: 75 MG/DL
HGB BLD-MCNC: 11.6 G/DL
IMM GRANULOCYTES NFR BLD AUTO: 0 %
KETONES URINE: NEGATIVE
LDLC SERPL CALC-MCNC: 51 MG/DL
LEUKOCYTE ESTERASE URINE: NEGATIVE
LYMPHOCYTES # BLD AUTO: 0.76 K/UL
LYMPHOCYTES NFR BLD AUTO: 24 %
MAN DIFF?: NORMAL
MCHC RBC-ENTMCNC: 31.7 GM/DL
MCHC RBC-ENTMCNC: 31.7 PG
MCV RBC AUTO: 100 FL
MONOCYTES # BLD AUTO: 0.24 K/UL
MONOCYTES NFR BLD AUTO: 7.6 %
NEUTROPHILS # BLD AUTO: 1.99 K/UL
NEUTROPHILS NFR BLD AUTO: 62.7 %
NITRITE URINE: NEGATIVE
NONHDLC SERPL-MCNC: 59 MG/DL
PH URINE: 6.5
PLATELET # BLD AUTO: 191 K/UL
POTASSIUM SERPL-SCNC: 3.8 MMOL/L
PROT SERPL-MCNC: 7 G/DL
PROTEIN URINE: NEGATIVE
RBC # BLD: 3.66 M/UL
RBC # FLD: 13.6 %
SODIUM SERPL-SCNC: 140 MMOL/L
SPECIFIC GRAVITY URINE: 1.01
TRIGL SERPL-MCNC: 43 MG/DL
TSH SERPL-ACNC: 1.24 UIU/ML
UROBILINOGEN URINE: NORMAL
WBC # FLD AUTO: 3.17 K/UL

## 2022-04-26 ENCOUNTER — APPOINTMENT (OUTPATIENT)
Dept: HEART AND VASCULAR | Facility: CLINIC | Age: 77
End: 2022-04-26

## 2022-05-02 ENCOUNTER — APPOINTMENT (OUTPATIENT)
Dept: HEART AND VASCULAR | Facility: CLINIC | Age: 77
End: 2022-05-02
Payer: COMMERCIAL

## 2022-05-02 ENCOUNTER — NON-APPOINTMENT (OUTPATIENT)
Age: 77
End: 2022-05-02

## 2022-05-02 VITALS
BODY MASS INDEX: 35.33 KG/M2 | HEIGHT: 62 IN | DIASTOLIC BLOOD PRESSURE: 74 MMHG | SYSTOLIC BLOOD PRESSURE: 152 MMHG | WEIGHT: 192 LBS | TEMPERATURE: 97.4 F | HEART RATE: 60 BPM

## 2022-05-02 PROCEDURE — 93000 ELECTROCARDIOGRAM COMPLETE: CPT

## 2022-05-02 PROCEDURE — 99214 OFFICE O/P EST MOD 30 MIN: CPT | Mod: 25

## 2022-05-02 NOTE — REVIEW OF SYSTEMS
[Dyspnea on exertion] : dyspnea during exertion [Chest Discomfort] : chest discomfort [Joint Pain] : joint pain [Joint Stiffness] : joint stiffness [Myalgia] : myalgia [Negative] : Heme/Lymph

## 2022-05-03 NOTE — PHYSICAL EXAM
[Normal S1, S2] : normal S1, S2 [Normal] : alert and oriented, normal memory [de-identified] : ambulates with cane [de-identified] : systolic 1/6 murmur heard best at base.

## 2022-05-03 NOTE — REASON FOR VISIT
[FreeTextEntry1] : 76 year old female with PMHX of CAD ( JOLIE to pLAD, EF 55% 03/12/21), HTN, HLD, preDM and sarcoidosis here for follow up.

## 2022-05-03 NOTE — DISCUSSION/SUMMARY
[FreeTextEntry1] : 76 year old female with PMHX of CAD ( JOLIE to pLAD, EF 55% 03/12/21), HTN, HLD, preDM and sarcoidosis here for follow up.\par \par CAD:\par - since it has been over a year since her PCI assessed her need for continued DAPT\par - DAPT score is -2, so her bleeding risk outweighs the benefit; will d/c Plavix and c/w ASA only\par \par Atypical chest pain, PEDRO: unlikely to be cardiac based on recent cath report; reproducible nature of chest discomfort is likely MSK in nature\par - likely related to deconditioning \par - will review echo today \par \par HLD: \par - c/w Crestor dosing (half a pill for 3 days per week and full 40mg the other 4 days)\par - can recheck lipids after 6-8 weeks and consider retesting CK, although only very mildly elevated\par \par HTN: above goal; Lisinopril d/cd 2/2 cough\par - pt has only been taking half of her HCTZ for 12.5mg totally daily dosing; she will increase to full pill for 25mg QD\par - while we would prefer she try ARB based on her known CAD, she defers at this time\par - will continue to monitor and decide on the need for additional medication at her follow up\par \par RTC in 3 months. \par Plan as above.

## 2022-05-03 NOTE — HISTORY OF PRESENT ILLNESS
[FreeTextEntry1] : 76 year old female with PMHX of CAD ( JOLIE to pLAD, EF 55% 03/12/21), HTN, HLD, preDM and sarcoidosis here for follow up.\par \par \par On on April 5, 2022, Dr. Bennett discussed with patient recent lab tests results at length. Pt with c/o of myalgia of LE is on Rosuvastatin 40 mg QD. Labs revealed CPK of 260. Advised pt to reduce Rosuvastatin to 1/2 tablet of 40 mg x3 week and continue with 40 mg 4x week. She reports compliance w/ this regimen since that call, but notes no change in her leg pain. She feels it is likely related to her arthritis and notes the pain mostly is in her knees, but it can be in her lower legs in the inside aspect of her calves.\par \par She still reports very intermittent chest discomfort - never on exertion - and the pain in reproducible with palpation.  She feels this is unchanged for many months. She is able to walk at least 4 blocks before her legs cause her pain and some PEDRO, but she rests for a bit and keeps going. She also feels this is longstanding and unchanged since previous visits. \par \par Venous US in March 2022 revealed no evidence of lower extremity deep or superficial venous thrombosis b/l; competent superficial vein system w/ no significant evidence of reflux disease b/l\par \par TTE in office today.  \par \par BP elevated in office today; she reports she was only taking half of her HCTZ on her won out of fears of hypotension. The Lisinopril caused a cough (of note, she told Dr. Bennett the cough continued after stopping the medication) however, she does not want to retry an ACE or ARB out of fear of side effects.

## 2022-05-19 ENCOUNTER — RX RENEWAL (OUTPATIENT)
Age: 77
End: 2022-05-19

## 2022-06-02 ENCOUNTER — RX RENEWAL (OUTPATIENT)
Age: 77
End: 2022-06-02

## 2022-06-02 NOTE — ED ADULT NURSE NOTE - NIH STROKE SCALE: TOTAL, QM
We are committed to providing you the best care possible. If you receive a survey after visiting one of our offices, please take time to share your experience concerning your physician office visit. These surveys are confidential and no health information about you is shared. We are eager to improve for you and we are counting on your feedback to help make that happen.
0

## 2022-06-15 ENCOUNTER — RX RENEWAL (OUTPATIENT)
Age: 77
End: 2022-06-15

## 2022-08-03 ENCOUNTER — APPOINTMENT (OUTPATIENT)
Dept: HEART AND VASCULAR | Facility: CLINIC | Age: 77
End: 2022-08-03

## 2022-08-03 ENCOUNTER — NON-APPOINTMENT (OUTPATIENT)
Age: 77
End: 2022-08-03

## 2022-08-03 VITALS
BODY MASS INDEX: 32.78 KG/M2 | OXYGEN SATURATION: 98 % | HEART RATE: 67 BPM | WEIGHT: 192 LBS | SYSTOLIC BLOOD PRESSURE: 134 MMHG | DIASTOLIC BLOOD PRESSURE: 80 MMHG | TEMPERATURE: 97.3 F | HEIGHT: 64 IN

## 2022-08-03 DIAGNOSIS — Z72.3 LACK OF PHYSICAL EXERCISE: ICD-10-CM

## 2022-08-03 DIAGNOSIS — E78.5 HYPERLIPIDEMIA, UNSPECIFIED: ICD-10-CM

## 2022-08-03 PROCEDURE — 93880 EXTRACRANIAL BILAT STUDY: CPT

## 2022-08-03 PROCEDURE — 99214 OFFICE O/P EST MOD 30 MIN: CPT | Mod: 24,25

## 2022-08-03 PROCEDURE — 93000 ELECTROCARDIOGRAM COMPLETE: CPT

## 2022-08-03 PROCEDURE — 36415 COLL VENOUS BLD VENIPUNCTURE: CPT

## 2022-08-03 PROCEDURE — ZZZZZ: CPT

## 2022-08-03 RX ORDER — CLOPIDOGREL BISULFATE 75 MG/1
75 TABLET, FILM COATED ORAL
Qty: 90 | Refills: 0 | Status: COMPLETED | COMMUNITY
Start: 2021-03-13 | End: 2022-08-03

## 2022-08-03 NOTE — REVIEW OF SYSTEMS
[Joint Pain] : joint pain [Dizziness] : dizziness [Fever] : no fever [Chills] : no chills [SOB] : no shortness of breath [Dyspnea on exertion] : not dyspnea during exertion [Chest Discomfort] : no chest discomfort [Lower Ext Edema] : no extremity edema [Leg Claudication] : no intermittent leg claudication [Palpitations] : no palpitations [Orthopnea] : no orthopnea [PND] : no PND [Syncope] : no syncope [Abdominal Pain] : no abdominal pain [Nausea] : no nausea

## 2022-08-03 NOTE — HISTORY OF PRESENT ILLNESS
[FreeTextEntry1] : Since her last visit, pt. continues to take Rosuvastatin 40 mg three days per week, and 20 mg the other days of the week due to hx of left leg myalgia on full dose. She reports the left leg myalgia is much improved on the revised dosing. She feels the leg pain may be related to her arthritis, which she feels mostly in her bilateral knees. \par \par She reports dizziness / feeling "off-balance" which has decreased in frequency over the last 7-8 months after changing her medication routine. She now takes her medication after breakfast and finds this helped decrease her symptoms. The dizziness now occurs once-twice per week, lasting only seconds at a time. She finds the sensation comes on when she's walking or talking too fast. Not associated with position changes, lying flat or a "room-spinning" sensation. \par \par Activity: Pt. walks 3-4 blocks per day with her cane. She denies associated shortness of breath or chest pain on exertion. \par \par Overall, she denies chest pain, shortness of breath, palpitations or lower extremity edema.\par \par Of note, pt. did not take her BP medication today \par \par

## 2022-08-03 NOTE — REASON FOR VISIT
[Hyperlipidemia] : hyperlipidemia [Hypertension] : hypertension [Coronary Artery Disease] : coronary artery disease [FreeTextEntry1] : Pt. is a 76 year old female with PMHx of CAD ( JOLIE to pLAD, EF 55% 03/12/21), HTN, HLD, pre-DM and sarcoidosis who presents today for follow-up.

## 2022-08-03 NOTE — ASSESSMENT
[FreeTextEntry1] : Pt. is a 76 year old female with PMHx of CAD ( JOLIE to pLAD, EF 55% 03/12/21), HTN, HLD, pre-DM and sarcoidosis who presents today for follow-up. \par \par Dizziness:\par - Unlikely cardiac etiology\par - Last carotid US (2019) revealed no evidence of significant plaque\par - Repeat carotid US today \par \par CAD s/p PCI:\par - Currently asymptomatic \par - Pt. to continue single antiplatelet therapy with ASA 81 mg QD\par - Continue Metoprolol succinate 25 mg QD and Rosuvastatin 40 mg three days/week, 20 mg the other days  \par \par HTN:\par - Slightly elevated today, however pt. did not take her medication this morning \par - Continue with HCTZ 25 mg QD and Metoprolol succinate 25 mg QD\par \par HLD:\par - Last LDL 51 (04/2022) at goal LDL < 70\par - Will repeat lipid panel today after dose adjustment to Rosuvastatin by her PCP in April \par - Continue Rosuvastatin 40 mg three days/week, 20 mg the other days  for lipid management \par \par Cardiomyopathy: \par - Stable \par - Last echo (05/2022) revealed mild mitral and aortic regurgitation and normal LVSF with EF 55%\par \par Pt. to RTC in 4 months. Will contact pt. with results of lab work.\par I have discussed the case with LUI Rojas. I have personally performed a history, physical exam, and my own medical decision making. I have reviewed the note and agree with the findings and plan.  The carotid Doppler showed severe plaque on the right but no significant stenosis.  We will continue to treat her risk factors.  Plavix is discontinued.\par

## 2022-08-03 NOTE — PHYSICAL EXAM
[Normal Conjunctiva] : normal conjunctiva [No Carotid Bruit] : no carotid bruit [Normal] : alert and oriented, normal memory

## 2022-08-04 LAB
CHOLEST SERPL-MCNC: 151 MG/DL
HDLC SERPL-MCNC: 85 MG/DL
LDLC SERPL CALC-MCNC: 54 MG/DL
NONHDLC SERPL-MCNC: 66 MG/DL
TRIGL SERPL-MCNC: 60 MG/DL

## 2022-08-09 NOTE — H&P ADULT - NS MD HP PULSE FEMORAL
VSS on RA. Patient denies chest pain and SOB. Patient denies pain at this time. Plan for echo, cards consult, PT/OT, lymph consult, and WOC consult for R buttock wound.    1+ bilateral, no bruits bilateral

## 2022-08-11 ENCOUNTER — APPOINTMENT (OUTPATIENT)
Dept: HEART AND VASCULAR | Facility: CLINIC | Age: 77
End: 2022-08-11

## 2022-08-17 ENCOUNTER — APPOINTMENT (OUTPATIENT)
Dept: HEART AND VASCULAR | Facility: CLINIC | Age: 77
End: 2022-08-17

## 2022-08-17 PROCEDURE — 99213 OFFICE O/P EST LOW 20 MIN: CPT

## 2022-08-18 NOTE — HISTORY OF PRESENT ILLNESS
[FreeTextEntry1] : The patient feels dizzy if she moves too quickly.  She feels off balance.  She tripped and fell on 2021.  However this also occurs when lying down.

## 2022-08-18 NOTE — DISCUSSION/SUMMARY
[FreeTextEntry1] : The patient has had increased episodes of dizziness.  She describes this mostly as being off balance.  She has not fallen since last year.  Some of the episodes occur at rest.  There were technical issues with her last monitor.  We will repeat it.  Her blood pressure is improved.  She will continue hydrochlorothiazide.

## 2022-08-23 RX ORDER — BLOOD PRESSURE TEST KIT-LARGE
KIT MISCELLANEOUS
Qty: 1 | Refills: 0 | Status: ACTIVE | COMMUNITY
Start: 2022-08-23 | End: 1900-01-01

## 2022-10-14 ENCOUNTER — RX RENEWAL (OUTPATIENT)
Age: 77
End: 2022-10-14

## 2022-10-31 ENCOUNTER — APPOINTMENT (OUTPATIENT)
Dept: INTERNAL MEDICINE | Facility: CLINIC | Age: 77
End: 2022-10-31

## 2022-10-31 VITALS
WEIGHT: 190 LBS | TEMPERATURE: 97.9 F | BODY MASS INDEX: 32.44 KG/M2 | OXYGEN SATURATION: 99 % | SYSTOLIC BLOOD PRESSURE: 113 MMHG | RESPIRATION RATE: 18 BRPM | DIASTOLIC BLOOD PRESSURE: 69 MMHG | HEART RATE: 91 BPM | HEIGHT: 64 IN

## 2022-10-31 DIAGNOSIS — M25.562 PAIN IN LEFT KNEE: ICD-10-CM

## 2022-10-31 DIAGNOSIS — R73.03 PREDIABETES.: ICD-10-CM

## 2022-10-31 DIAGNOSIS — Z91.81 HISTORY OF FALLING: ICD-10-CM

## 2022-10-31 DIAGNOSIS — Z12.11 ENCOUNTER FOR SCREENING FOR MALIGNANT NEOPLASM OF COLON: ICD-10-CM

## 2022-10-31 PROCEDURE — 99215 OFFICE O/P EST HI 40 MIN: CPT

## 2022-10-31 NOTE — PHYSICAL EXAM
[Normal] : normal rate, regular rhythm, normal S1 and S2 and no murmur heard [Coordination Grossly Intact] : coordination grossly intact [No Focal Deficits] : no focal deficits [de-identified] : swelling and bony deformity of left knee, difficulty ambulating and weight bearing on extremity, limited ROM of LLE and pain with flexion and extension of L knee [de-identified] : ecchymosis over RLE [de-identified] : abnormal gait r/t bony deformity of knee

## 2022-10-31 NOTE — REVIEW OF SYSTEMS
[Itching] : Itching [Dizziness] : dizziness [Unsteady Walking] : ataxia [Negative] : Respiratory [Joint Pain] : joint pain [Joint Stiffness] : joint stiffness [Joint Swelling] : joint swelling [Skin Rash] : no skin rash [Headache] : no headache [Fainting] : no fainting [Confusion] : no confusion [Memory Loss] : no memory loss

## 2022-10-31 NOTE — HISTORY OF PRESENT ILLNESS
[FreeTextEntry1] : knee pain s/p fall [de-identified] : Patient is a 77 year old F with PMHx of HTN, HLD and CAD who presents to the office for evaluation of knee pain s/p fall\par \par Knee pain\par - Started after pt fell walking up stairs on 10/21/22\par - Fell forward and onto side. Denies head trauma or LOC. Pt was able to ambulate with difficulty after the fall\par - States knee was erythematous and swollen initially but has significantly improved\par - States her left knee has been mildly swollen and "locking" since fall. \par - Pt has had difficulty with ambulation \par - Has tried Tylenol for knee pain and rubs it with alcohol, states it doesn't help\par - Pain radiates from left lateral thigh to knee, down to left shin and foot, denies paresthesia. \par - Pt went to  Saturday and was told her knee was dislocated and she needed to go to ED to relocate the patella - Pt declined\par - Pt has hx of knee dislocations in the past\par -stopped taking metoprolol and metformin for the past month, states it's not as bad as it used to be

## 2022-11-01 ENCOUNTER — EMERGENCY (EMERGENCY)
Facility: HOSPITAL | Age: 77
LOS: 1 days | Discharge: HOME | End: 2022-11-01
Admitting: EMERGENCY MEDICINE
Payer: COMMERCIAL

## 2022-11-01 VITALS
HEIGHT: 65 IN | SYSTOLIC BLOOD PRESSURE: 131 MMHG | OXYGEN SATURATION: 98 % | HEART RATE: 82 BPM | TEMPERATURE: 99 F | WEIGHT: 190.04 LBS | RESPIRATION RATE: 16 BRPM | DIASTOLIC BLOOD PRESSURE: 83 MMHG

## 2022-11-01 DIAGNOSIS — Z98.49 CATARACT EXTRACTION STATUS, UNSPECIFIED EYE: Chronic | ICD-10-CM

## 2022-11-01 PROCEDURE — 73562 X-RAY EXAM OF KNEE 3: CPT | Mod: 26

## 2022-11-01 PROCEDURE — 73562 X-RAY EXAM OF KNEE 3: CPT | Mod: 26,LT

## 2022-11-01 PROCEDURE — 73562 X-RAY EXAM OF KNEE 3: CPT

## 2022-11-01 PROCEDURE — 99283 EMERGENCY DEPT VISIT LOW MDM: CPT | Mod: 25

## 2022-11-01 RX ORDER — IBUPROFEN 200 MG
600 TABLET ORAL ONCE
Refills: 0 | Status: COMPLETED | OUTPATIENT
Start: 2022-11-01 | End: 2022-11-01

## 2022-11-01 RX ORDER — DICLOFENAC SODIUM 30 MG/G
1 GEL TOPICAL
Qty: 30 | Refills: 0
Start: 2022-11-01 | End: 2022-11-07

## 2022-11-01 RX ADMIN — Medication 600 MILLIGRAM(S): at 17:57

## 2022-11-01 NOTE — ED PROVIDER NOTE - OBJECTIVE STATEMENT
The pt is a 76 y/o F, who presents to ED c/o L knee pain s/p fall a wk ago. Pt states that knee "locks" occasionally, currently pain free, but it was painful earlier today -7/10, took tyl w/relief. Saw pmd today and was given referral to see ortho, but sent to ED for xrays. Denies numbness or tingling to toes, decreased ROM, inability to walk, any other injuries.

## 2022-11-01 NOTE — ED PROVIDER NOTE - MUSCULOSKELETAL, MLM
Spine appears normal, range of motion is not limited, no muscle or joint tenderness; L knee: quads intact, no discolorations, no breaks in skin, min swelling over anterior aspect, FROM, no ligamentous laxity appreciated, no tib/fib tend, no calf tend, pedal pulse 2+, ambulatory w/o assistance w/steady gait

## 2022-11-01 NOTE — ED PROVIDER NOTE - NSFOLLOWUPINSTRUCTIONS_ED_ALL_ED_FT
REST, ICE, ELEVATE, ACE WRAP WHEN WALKING, VOLTAREN AS NEEDED, FOLLOW UP WITH ORTHOPEDICS  WHAT YOU NEED TO KNOW:  P.R.I.C.E. treatment is a 5-step process used to decrease swelling and pain caused by an injury. P.R.I.C.E. stands for protect, rest, ice, compress, and elevate. Start P.R.I.C.E. within 24 to 48 hours of an injury.  DISCHARGE INSTRUCTIONS:  Return to the emergency department if:   •Your pain is severe.  •You have severe swelling or deformity.  •You have numbness in the injured area.  Call your doctor if:   •Your pain and swelling do not go away after a few days.  •You have questions or concerns about your condition or care.  How to use P.R.I.C.E. treatment:   R.I.C.E.  •Protect your injury from more damage. Support the injured area with a brace or splint. Your healthcare provider will tell you how long to use the brace or splint.  •Rest your injured area as directed. You may need to stop using, or keep weight off, the injury for 48 hours or longer. Your healthcare provider may recommend crutches or another device. Return to your usual activities as directed.  •Apply ice on your injured area for 15 to 20 minutes every 4 hours or as directed. Use an ice pack, or put crushed ice in a plastic bag. Cover the bag with a towel before you apply it to your skin. Ice helps prevent tissue damage and decreases swelling and pain.  •Compress (keep pressure on) the injured area. Compression will help decrease swelling and support the injured area. Use an elastic bandage, air stirrup, splint, or sling as directed. If you use an elastic bandage, make sure the bandage is not too tight. You should be able to slip 2 fingers between the bandage and your skin.  •Elevate the injured area above the level of your heart as often as you can. This will help decrease swelling and pain. Prop the injured area on pillows or blankets to keep it elevated comfortably.

## 2022-11-01 NOTE — ED PROVIDER NOTE - NSCAREINITIATED _GEN_ER
November 1, 2022      [facility]  [address] [address]  Phone: [phone]  Fax: [phone]       Patient: Herminia Gutierrez   YOB: 2012  Date of Visit: 11/01/2022    To Whom It May Concern:    Peg Gutierrez  was at [facility] on 11/01/2022. The patient may return to work/school on 11/07/2022 with no restrictions. If you have any questions or concerns, or if I can be of further assistance, please do not hesitate to contact me.    Sincerely,    Susy Calzada      Loree Pruitt(Attending)

## 2022-11-01 NOTE — ED ADULT TRIAGE NOTE - CHIEF COMPLAINT QUOTE
Pt reports trip and fall on Saturday, denies hitting head, no loc, not taking blood thinners. Reports pain to left knee post fall, pt able to ambulate with cane.

## 2022-11-01 NOTE — ED ADULT NURSE NOTE - NSIMPLEMENTINTERV_GEN_ALL_ED
Implemented All Fall Risk Interventions:  [place] to call system. Call bell, personal items and telephone within reach. Instruct patient to call for assistance. Room bathroom lighting operational. Non-slip footwear when patient is off stretcher. Physically safe environment: no spills, clutter or unnecessary equipment. Stretcher in lowest position, wheels locked, appropriate side rails in place. Provide visual cue, wrist band, yellow gown, etc. Monitor gait and stability. Monitor for mental status changes and reorient to person, place, and time. Review medications for side effects contributing to fall risk. Reinforce activity limits and safety measures with patient and family.

## 2022-11-01 NOTE — ED ADULT NURSE NOTE - OBJECTIVE STATEMENT
Pt is a 78 y/o female A&Ox4 in NAD ambulatory with cane c/o left knee pain s/p mechanical fall on Saturday. Pt denies head injury, LOC, blood thinners. No obvious deformity noted.

## 2022-11-01 NOTE — ED PROVIDER NOTE - PATIENT PORTAL LINK FT
You can access the FollowMyHealth Patient Portal offered by [facility] by registering at the following website: [email or web address]. By joining [employer]’s FollowMyHealth portal, you will also be able to view your health information using other applications (apps) compatible with our system.

## 2022-11-03 ENCOUNTER — APPOINTMENT (OUTPATIENT)
Dept: INTERNAL MEDICINE | Facility: CLINIC | Age: 77
End: 2022-11-03

## 2022-11-04 DIAGNOSIS — M25.562 PAIN IN LEFT KNEE: ICD-10-CM
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
## 2022-11-04 DIAGNOSIS — E55.9 VITAMIN D DEFICIENCY, UNSPECIFIED: ICD-10-CM

## 2022-11-04 DIAGNOSIS — Z79.02 LONG TERM (CURRENT) USE OF ANTITHROMBOTICS/ANTIPLATELETS: ICD-10-CM

## 2022-11-04 DIAGNOSIS — M17.12 UNILATERAL PRIMARY OSTEOARTHRITIS, LEFT KNEE: ICD-10-CM

## 2022-11-04 DIAGNOSIS — E11.9 TYPE 2 DIABETES MELLITUS WITHOUT COMPLICATIONS: ICD-10-CM
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
## 2022-11-04 DIAGNOSIS — I10 ESSENTIAL (PRIMARY) HYPERTENSION: ICD-10-CM

## 2022-11-04 DIAGNOSIS — E78.5 HYPERLIPIDEMIA, UNSPECIFIED: ICD-10-CM

## 2022-11-04 DIAGNOSIS — D86.9 SARCOIDOSIS, UNSPECIFIED: ICD-10-CM

## 2022-11-04 DIAGNOSIS — Z79.84 LONG TERM (CURRENT) USE OF ORAL HYPOGLYCEMIC DRUGS: ICD-10-CM

## 2022-11-04 DIAGNOSIS — Z87.19 PERSONAL HISTORY OF OTHER DISEASES OF THE DIGESTIVE SYSTEM: ICD-10-CM

## 2022-11-04 DIAGNOSIS — Z86.16 PERSONAL HISTORY OF COVID-19: ICD-10-CM

## 2022-11-14 ENCOUNTER — APPOINTMENT (OUTPATIENT)
Dept: ORTHOPEDIC SURGERY | Facility: CLINIC | Age: 77
End: 2022-11-14

## 2022-11-14 VITALS — WEIGHT: 189 LBS | HEIGHT: 66 IN | BODY MASS INDEX: 30.37 KG/M2

## 2022-11-14 DIAGNOSIS — M23.92 UNSPECIFIED INTERNAL DERANGEMENT OF LEFT KNEE: ICD-10-CM

## 2022-11-14 DIAGNOSIS — M17.0 BILATERAL PRIMARY OSTEOARTHRITIS OF KNEE: ICD-10-CM

## 2022-11-14 PROCEDURE — 20610 DRAIN/INJ JOINT/BURSA W/O US: CPT | Mod: LT

## 2022-11-14 PROCEDURE — 99203 OFFICE O/P NEW LOW 30 MIN: CPT | Mod: 25

## 2022-11-14 RX ORDER — DICLOFENAC SODIUM 1% 10 MG/G
1 GEL TOPICAL
Qty: 100 | Refills: 0 | Status: ACTIVE | COMMUNITY
Start: 2022-11-01

## 2022-12-07 ENCOUNTER — NON-APPOINTMENT (OUTPATIENT)
Age: 77
End: 2022-12-07

## 2022-12-07 ENCOUNTER — APPOINTMENT (OUTPATIENT)
Dept: HEART AND VASCULAR | Facility: CLINIC | Age: 77
End: 2022-12-07

## 2022-12-07 VITALS
SYSTOLIC BLOOD PRESSURE: 119 MMHG | DIASTOLIC BLOOD PRESSURE: 75 MMHG | HEART RATE: 76 BPM | BODY MASS INDEX: 29.89 KG/M2 | WEIGHT: 186 LBS | HEIGHT: 66 IN

## 2022-12-07 DIAGNOSIS — D86.9 SARCOIDOSIS, UNSPECIFIED: ICD-10-CM

## 2022-12-07 PROCEDURE — 99214 OFFICE O/P EST MOD 30 MIN: CPT | Mod: 25

## 2022-12-07 PROCEDURE — 93000 ELECTROCARDIOGRAM COMPLETE: CPT

## 2022-12-07 RX ORDER — METOPROLOL SUCCINATE 25 MG/1
25 TABLET, EXTENDED RELEASE ORAL
Qty: 90 | Refills: 2 | Status: COMPLETED | COMMUNITY
Start: 2021-03-13 | End: 2022-12-07

## 2022-12-08 PROBLEM — D86.9 SARCOIDOSIS: Status: ACTIVE | Noted: 2017-05-24

## 2022-12-08 NOTE — DISCUSSION/SUMMARY
[FreeTextEntry1] : 77 year old female with PMHX of CAD ( JOLIE to pLAD, EF 55% 03/12/21), HTN, HLD, preDM and sarcoidosis here for follow up.\par \par CAD: Atypical chest discomfort noted at rest. EKG SR 86 with T wave abn, similar to history. Chest pain has not recurred and  a cardiac etiology is not likely. Continue with ASA and Statin with LDL goal of <70.\par HTN: Controlled. Continue with HCTZ 25mg QD\par HLD:  LDL Goal < 70. Controlled at 54. Continue with Rosuvastatin 40mg\par Leg Pain: Long standing with decreased pedal pulses. Will have patient return for ROBERTO\par Dizziness: Holter report reviewed. Not likely cardiac. Follow up with PCP\par DM: Patient self stopped Metformin. Follow up with PCP\par \par Return in 3 months with an ROBERTO\par \par \par I, Dr. Ofelia Morris personally performed the evaluation and management services for this patient who presents today with a changed problem.  The evaluation and management includes conducting the examination assessing all conditions and establishing a new plan of care.  Today my ACP Abi Leyva was here and recorded the evaluation and management services.                                                                        \par \par Patient has recurrent chest discomfort.  Plan as above. [EKG obtained to assist in diagnosis and management of assessed problem(s)] : EKG obtained to assist in diagnosis and management of assessed problem(s)

## 2022-12-08 NOTE — REVIEW OF SYSTEMS
Statement Selected [Chest Discomfort] : chest discomfort [Leg Claudication] : intermittent leg claudication [Fever] : no fever [Chills] : no chills [SOB] : no shortness of breath [Dyspnea on exertion] : not dyspnea during exertion [Lower Ext Edema] : no extremity edema [Orthopnea] : no orthopnea [Cough] : no cough [Nausea] : no nausea [Vomiting] : no vomiting [Diarrhea] : diarrhea [Dizziness] : no dizziness [Numbness (Hypoesthesia)] : no numbness [Weakness] : no weakness [Speech Disturbance] : no speech disturbance [Easy Bleeding] : no tendency for easy bleeding

## 2022-12-08 NOTE — HISTORY OF PRESENT ILLNESS
[FreeTextEntry1] : 77 year old female with PMHX of CAD ( JOLIE to pLAD, EF 55% 03/12/21), HTN, HLD, preDM and sarcoidosis here for follow up.\par \par Since last visit, she remains to feel unbalanced with moving too fast or closing her eyes. She has self stopped Metoprolol thinking it was the cause. She feels the symptoms has not occurred as often. She does report fallen 3 more times since last visit. She is attributing this to mechanical falls. She feels she has tripped up walking up stairs, tripped over her feet and over carpet. She had no preceding palpitations, chest pains or out of breath. \par \par Holter returned with 13 hours of analysis. No significant arrhythmia noted. \par \par She did report 1 episode of chest pains 2 weeks ago during rest. She remains to walk but is limited by left leg pains. She reports left knee pain but calves and shoots up to left thigh on exertion. Most she can walk now is 3 blocks before her legs start to pain. She has similar pains at rest. \par

## 2022-12-08 NOTE — REASON FOR VISIT
[Follow-Up - Clinic] : a clinic follow-up of [Coronary Artery Disease] : coronary artery disease [Symptom and Test Evaluation] : symptom and test evaluation

## 2022-12-08 NOTE — PHYSICAL EXAM
[Well Developed] : well developed [Well Nourished] : well nourished [No Acute Distress] : no acute distress [No Carotid Bruit] : no carotid bruit [Normal S1, S2] : normal S1, S2 [No Murmur] : no murmur [Clear Lung Fields] : clear lung fields [Good Air Entry] : good air entry [Soft] : abdomen soft [Non Tender] : non-tender [No Edema] : no edema [Moves all extremities] : moves all extremities [No Focal Deficits] : no focal deficits [Normal Speech] : normal speech [Alert and Oriented] : alert and oriented [Normal memory] : normal memory [de-identified] : LLE PT 0, DP2+, Unable to appreciate RLE pulses

## 2022-12-13 ENCOUNTER — NON-APPOINTMENT (OUTPATIENT)
Age: 77
End: 2022-12-13

## 2022-12-13 NOTE — PROCEDURE
[Injection] : Injection [Left] : of the left [Knee Joint] : knee joint [Osteoarthritis] : Osteoarthritis [Joint Pain] : Joint pain [Patient] : patient [Risk] : risk [Benefits] : benefits [Alternatives] : alternatives [Bleeding] : bleeding [Infection] : infection [Allergic Reaction] : allergic reaction [Verbal Consent Obtained] : verbal consent was obtained prior to the procedure [Alcohol] : Alcohol [Ethyl Chloride Spray] : ethyl chloride spray was used as a topical anesthetic [Lateral] : lateral [Anterior] : anterior [22] : a 22-gauge [1% Lidocaine___(mL)] : [unfilled] mL of 1% Lidocaine [Triamcinolone 40mg/mL___(mL)] : [unfilled] ~UmL of 40mg/mL triamcinolone [Bandage Applied] : a bandage [Tolerated Well] : The patient tolerated the procedure well [None] : none [FreeTextEntry1] : ChloraPrep [de-identified] : After she could sit and flex her knee to 90 degrees with much less pain

## 2022-12-13 NOTE — HISTORY OF PRESENT ILLNESS
[de-identified] : Ms. Neves is a 78 y/o woman who comes in for evaluation for LEFT knee pain and swelling that started years ago but has been worse over the last several weeks after she fell on some steps and hurt her knee.  She has had intermittent locking and it is very painful to move her knee.  She went for x-rays on November 1 showing severe arthritis and no fractures but there is a loose body infrapatellar region.\par She takes Tylenol Extra Strength arthritis.  It is worse with certain movements and bending and walking and sleeping.\par She did have steroid and hyaluronic acid injections done in her knees many years ago.  She was told many years ago that she had significant arthritis.  Knee replacement was suggested but her doctor feels at this point she would not be a good candidate for surgery because of heart issues and other medical problems.\par She walks with a cane.  She has a walker at home but does not use it.  She did do physical therapy in the past.  No recent physical therapy.  She is going to go to acupuncture tomorrow

## 2022-12-13 NOTE — ASSESSMENT
[FreeTextEntry1] : 77-year-old woman with severe knee osteoarthritis bilaterally who has had several falls.  She may have what looks like a loose body in the left knee where there is an ossific density likely fractured spur just distal to the inferior pole of the patella.  This may be causing catching and locking and affecting the motion.  After the steroid and lidocaine injection done today she did have less pain and could move her knee a little better with flexion up to 90 degrees.  We will see if it gives her any long-term relief.  She will try to do some exercises on her own.  She may be better walking with a walker than the cane.  She will try acupuncture.  Tylenol, ice and heat as needed for pain.\par Follow-up in 4 to 6 weeks if its not doing better.\par Unfortunately she is not a good candidate medically for her knee replacement which would really help her.  We could always think about doing an arthroscopy and removal of loose body if her pain persists and it seems like that might give her enough relief that it would be worth doing.  Certainly it is much less invasive but still surgery with risks of surgery and anesthesia.

## 2022-12-13 NOTE — PHYSICAL EXAM
[LE] : Sensory: Intact in bilateral lower extremities [Normal RLE] : Right Lower Extremity: No scars, rashes, lesions, ulcers, skin intact [Normal LLE] : Left Lower Extremity: No scars, rashes, lesions, ulcers, skin intact [Normal Touch] : sensation intact for touch [Normal] : Oriented to person, place, and time, insight and judgement were intact and the affect was normal [de-identified] : Left knee with right for comparison.\par Severely antalgic gait favoring her left knee.\par Left knee initial range of motion was from 0 to about 30 degrees with severe pain.  Very tender peripatellar region as well as joint lines.\par No varus or valgus laxity.  Weakness of extensor mechanism.\par Right knee is 0 to 110 degrees flexion with tenderness of the joint lines.\par No significant effusion left knee\par Motor and sensation are intact distally. [de-identified] : \par \par PROCEDURE DATE: 11/01/2022\par \par INTERPRETATION: History: Pain.\par \par Technique: 3 radiographic views of the left knee were performed.\par \par Comparisons: None.\par \par Findings:\par \par No acute fracture or dislocation is visualized.\par \par There is periarticular spurring. There is mild medial compartment joint space narrowing. There is moderate patellofemoral joint space narrowing.\par \par 12 mm intra-articular body noted in the inferior patellofemoral joint space. There is a small suprapatellar knee joint effusion.\par \par There are vascular calcifications.\par \par Impression:\par \par No acute abnormality visualized.\par \par Moderate osteoarthritis.\par \par 12 mm intra-articular body noted in the inferior patellofemoral joint space.\par \par Small suprapatellar knee joint effusion.\par \par If there are no contraindications, further evaluation with MRI may be of use

## 2023-01-08 ENCOUNTER — EMERGENCY (EMERGENCY)
Facility: HOSPITAL | Age: 78
LOS: 1 days | Discharge: HOME | End: 2023-01-08
Attending: EMERGENCY MEDICINE | Admitting: EMERGENCY MEDICINE
Payer: COMMERCIAL

## 2023-01-08 VITALS
TEMPERATURE: 98 F | OXYGEN SATURATION: 97 % | RESPIRATION RATE: 20 BRPM | DIASTOLIC BLOOD PRESSURE: 85 MMHG | SYSTOLIC BLOOD PRESSURE: 160 MMHG | HEART RATE: 64 BPM

## 2023-01-08 VITALS
HEIGHT: 66 IN | WEIGHT: 184.97 LBS | SYSTOLIC BLOOD PRESSURE: 142 MMHG | HEART RATE: 93 BPM | TEMPERATURE: 97 F | DIASTOLIC BLOOD PRESSURE: 86 MMHG | OXYGEN SATURATION: 100 % | RESPIRATION RATE: 20 BRPM

## 2023-01-08 DIAGNOSIS — Z98.49 CATARACT EXTRACTION STATUS, UNSPECIFIED EYE: Chronic | ICD-10-CM

## 2023-01-08 LAB
ALBUMIN SERPL ELPH-MCNC: 4.2 G/DL — SIGNIFICANT CHANGE UP (ref 3.3–5)
ALP SERPL-CCNC: 67 U/L — SIGNIFICANT CHANGE UP (ref 40–120)
ALT FLD-CCNC: 11 U/L — SIGNIFICANT CHANGE UP (ref 10–45)
ANION GAP SERPL CALC-SCNC: 12 MMOL/L — SIGNIFICANT CHANGE UP (ref 5–17)
APTT BLD: 25.6 SEC — LOW (ref 27.5–35.5)
AST SERPL-CCNC: 26 U/L — SIGNIFICANT CHANGE UP (ref 10–40)
BASE EXCESS BLDV CALC-SCNC: 2.3 MMOL/L — SIGNIFICANT CHANGE UP (ref -2–3)
BASE EXCESS BLDV CALC-SCNC: 4.8 MMOL/L — HIGH (ref -2–3)
BASOPHILS # BLD AUTO: 0 K/UL — SIGNIFICANT CHANGE UP (ref 0–0.2)
BASOPHILS NFR BLD AUTO: 0 % — SIGNIFICANT CHANGE UP (ref 0–2)
BILIRUB SERPL-MCNC: 0.4 MG/DL — SIGNIFICANT CHANGE UP (ref 0.2–1.2)
BUN SERPL-MCNC: 6 MG/DL — LOW (ref 7–23)
CA-I SERPL-SCNC: 1.1 MMOL/L — LOW (ref 1.15–1.33)
CALCIUM SERPL-MCNC: 9.4 MG/DL — SIGNIFICANT CHANGE UP (ref 8.4–10.5)
CHLORIDE SERPL-SCNC: 99 MMOL/L — SIGNIFICANT CHANGE UP (ref 96–108)
CO2 BLDV-SCNC: 24.1 MMOL/L — SIGNIFICANT CHANGE UP (ref 22–26)
CO2 BLDV-SCNC: 24.3 MMOL/L — SIGNIFICANT CHANGE UP (ref 22–26)
CO2 SERPL-SCNC: 23 MMOL/L — SIGNIFICANT CHANGE UP (ref 22–31)
CREAT SERPL-MCNC: 0.69 MG/DL — SIGNIFICANT CHANGE UP (ref 0.5–1.3)
EGFR: 88 ML/MIN/1.73M2 — SIGNIFICANT CHANGE UP
EOSINOPHIL # BLD AUTO: 0 K/UL — SIGNIFICANT CHANGE UP (ref 0–0.5)
EOSINOPHIL NFR BLD AUTO: 0 % — SIGNIFICANT CHANGE UP (ref 0–6)
FLUAV AG NPH QL: DETECTED
FLUBV AG NPH QL: SIGNIFICANT CHANGE UP
GAS PNL BLDV: 126 MMOL/L — LOW (ref 136–145)
GAS PNL BLDV: SIGNIFICANT CHANGE UP
GIANT PLATELETS BLD QL SMEAR: PRESENT — SIGNIFICANT CHANGE UP
GLUCOSE SERPL-MCNC: 123 MG/DL — HIGH (ref 70–99)
HCO3 BLDV-SCNC: 23 MMOL/L — SIGNIFICANT CHANGE UP (ref 22–29)
HCO3 BLDV-SCNC: 24 MMOL/L — SIGNIFICANT CHANGE UP (ref 22–29)
HCT VFR BLD CALC: 36.2 % — SIGNIFICANT CHANGE UP (ref 34.5–45)
HGB BLD-MCNC: 12.4 G/DL — SIGNIFICANT CHANGE UP (ref 11.5–15.5)
INR BLD: 0.97 — SIGNIFICANT CHANGE UP (ref 0.88–1.16)
LYMPHOCYTES # BLD AUTO: 1.11 K/UL — SIGNIFICANT CHANGE UP (ref 1–3.3)
LYMPHOCYTES # BLD AUTO: 29.8 % — SIGNIFICANT CHANGE UP (ref 13–44)
MANUAL SMEAR VERIFICATION: SIGNIFICANT CHANGE UP
MCHC RBC-ENTMCNC: 32.2 PG — SIGNIFICANT CHANGE UP (ref 27–34)
MCHC RBC-ENTMCNC: 34.3 GM/DL — SIGNIFICANT CHANGE UP (ref 32–36)
MCV RBC AUTO: 94 FL — SIGNIFICANT CHANGE UP (ref 80–100)
MONOCYTES # BLD AUTO: 0.33 K/UL — SIGNIFICANT CHANGE UP (ref 0–0.9)
MONOCYTES NFR BLD AUTO: 8.8 % — SIGNIFICANT CHANGE UP (ref 2–14)
NEUTROPHILS # BLD AUTO: 2.03 K/UL — SIGNIFICANT CHANGE UP (ref 1.8–7.4)
NEUTROPHILS NFR BLD AUTO: 54.4 % — SIGNIFICANT CHANGE UP (ref 43–77)
PCO2 BLDV: 21 MMHG — LOW (ref 39–42)
PCO2 BLDV: 26 MMHG — LOW (ref 39–42)
PH BLDV: 7.56 — HIGH (ref 7.32–7.43)
PH BLDV: 7.66 — CRITICAL HIGH (ref 7.32–7.43)
PLAT MORPH BLD: ABNORMAL
PLATELET # BLD AUTO: 173 K/UL — SIGNIFICANT CHANGE UP (ref 150–400)
PO2 BLDV: 46 MMHG — HIGH (ref 25–45)
PO2 BLDV: 73 MMHG — HIGH (ref 25–45)
POTASSIUM BLDV-SCNC: 2.4 MMOL/L — CRITICAL LOW (ref 3.5–5.1)
POTASSIUM SERPL-MCNC: 3.1 MMOL/L — LOW (ref 3.5–5.3)
POTASSIUM SERPL-SCNC: 3.1 MMOL/L — LOW (ref 3.5–5.3)
PROT SERPL-MCNC: 7.4 G/DL — SIGNIFICANT CHANGE UP (ref 6–8.3)
PROTHROM AB SERPL-ACNC: 11.5 SEC — SIGNIFICANT CHANGE UP (ref 10.5–13.4)
RBC # BLD: 3.85 M/UL — SIGNIFICANT CHANGE UP (ref 3.8–5.2)
RBC # FLD: 12.6 % — SIGNIFICANT CHANGE UP (ref 10.3–14.5)
RBC BLD AUTO: NORMAL — SIGNIFICANT CHANGE UP
RSV RNA NPH QL NAA+NON-PROBE: SIGNIFICANT CHANGE UP
SAO2 % BLDV: 85.2 % — SIGNIFICANT CHANGE UP (ref 67–88)
SAO2 % BLDV: 97.9 % — HIGH (ref 67–88)
SARS-COV-2 RNA SPEC QL NAA+PROBE: SIGNIFICANT CHANGE UP
SMUDGE CELLS # BLD: PRESENT — SIGNIFICANT CHANGE UP
SODIUM SERPL-SCNC: 134 MMOL/L — LOW (ref 135–145)
VARIANT LYMPHS # BLD: 7 % — HIGH (ref 0–6)
WBC # BLD: 3.73 K/UL — LOW (ref 3.8–10.5)
WBC # FLD AUTO: 3.73 K/UL — LOW (ref 3.8–10.5)

## 2023-01-08 PROCEDURE — G1004: CPT

## 2023-01-08 PROCEDURE — 84484 ASSAY OF TROPONIN QUANT: CPT

## 2023-01-08 PROCEDURE — 71045 X-RAY EXAM CHEST 1 VIEW: CPT

## 2023-01-08 PROCEDURE — 85025 COMPLETE CBC W/AUTO DIFF WBC: CPT

## 2023-01-08 PROCEDURE — 87637 SARSCOV2&INF A&B&RSV AMP PRB: CPT

## 2023-01-08 PROCEDURE — 99285 EMERGENCY DEPT VISIT HI MDM: CPT | Mod: 25

## 2023-01-08 PROCEDURE — 96374 THER/PROPH/DIAG INJ IV PUSH: CPT | Mod: XU

## 2023-01-08 PROCEDURE — 85379 FIBRIN DEGRADATION QUANT: CPT

## 2023-01-08 PROCEDURE — 82962 GLUCOSE BLOOD TEST: CPT

## 2023-01-08 PROCEDURE — 93005 ELECTROCARDIOGRAM TRACING: CPT

## 2023-01-08 PROCEDURE — 99285 EMERGENCY DEPT VISIT HI MDM: CPT

## 2023-01-08 PROCEDURE — 82803 BLOOD GASES ANY COMBINATION: CPT

## 2023-01-08 PROCEDURE — 85730 THROMBOPLASTIN TIME PARTIAL: CPT

## 2023-01-08 PROCEDURE — 71045 X-RAY EXAM CHEST 1 VIEW: CPT | Mod: 26

## 2023-01-08 PROCEDURE — 71275 CT ANGIOGRAPHY CHEST: CPT | Mod: 26,MG

## 2023-01-08 PROCEDURE — 82330 ASSAY OF CALCIUM: CPT

## 2023-01-08 PROCEDURE — 80053 COMPREHEN METABOLIC PANEL: CPT

## 2023-01-08 PROCEDURE — 83880 ASSAY OF NATRIURETIC PEPTIDE: CPT

## 2023-01-08 PROCEDURE — 85610 PROTHROMBIN TIME: CPT

## 2023-01-08 PROCEDURE — 71275 CT ANGIOGRAPHY CHEST: CPT | Mod: MG

## 2023-01-08 PROCEDURE — 84295 ASSAY OF SERUM SODIUM: CPT

## 2023-01-08 PROCEDURE — 36415 COLL VENOUS BLD VENIPUNCTURE: CPT

## 2023-01-08 PROCEDURE — 84132 ASSAY OF SERUM POTASSIUM: CPT

## 2023-01-08 RX ORDER — POTASSIUM CHLORIDE 20 MEQ
40 PACKET (EA) ORAL ONCE
Refills: 0 | Status: COMPLETED | OUTPATIENT
Start: 2023-01-08 | End: 2023-01-08

## 2023-01-08 RX ORDER — AMLODIPINE BESYLATE 2.5 MG/1
5 TABLET ORAL ONCE
Refills: 0 | Status: DISCONTINUED | OUTPATIENT
Start: 2023-01-08 | End: 2023-01-08

## 2023-01-08 RX ORDER — SODIUM CHLORIDE 9 MG/ML
500 INJECTION INTRAMUSCULAR; INTRAVENOUS; SUBCUTANEOUS ONCE
Refills: 0 | Status: COMPLETED | OUTPATIENT
Start: 2023-01-08 | End: 2023-01-08

## 2023-01-08 RX ORDER — KETOROLAC TROMETHAMINE 30 MG/ML
15 SYRINGE (ML) INJECTION ONCE
Refills: 0 | Status: DISCONTINUED | OUTPATIENT
Start: 2023-01-08 | End: 2023-01-08

## 2023-01-08 RX ADMIN — Medication 15 MILLIGRAM(S): at 21:32

## 2023-01-08 RX ADMIN — SODIUM CHLORIDE 1000 MILLILITER(S): 9 INJECTION INTRAMUSCULAR; INTRAVENOUS; SUBCUTANEOUS at 21:31

## 2023-01-08 RX ADMIN — Medication 0.5 MILLIGRAM(S): at 19:22

## 2023-01-08 RX ADMIN — Medication 40 MILLIEQUIVALENT(S): at 20:27

## 2023-01-08 NOTE — ED PROVIDER NOTE - PHYSICAL EXAMINATION
CONST: anxious NAD speaking in full sentences lying flat  HEAD: atraumatic  EYES: conjunctivae clear, PERRL, EOMI  ENT: mmm  NECK: supple/FROM, no jvd  CARD: rrr no murmurs  CHEST: +tachypnea, ctab no r/r/w, no stridor/retractions/tripoding  ABD: soft, nd, nttp, no rebound/guarding  EXT: FROM, symmetric distal pulses intact  SKIN: warm, dry, no rash, no pedal edema/ttp/rash, cap refill <2sec  NEURO: a+ox3, 5/5 strength x4, gross sensation intact x4

## 2023-01-08 NOTE — ED ADULT TRIAGE NOTE - RESPIRATORY RATE (BREATHS/MIN)
Spoke with patient to see how the Gabapentin is helping with her foot pain. Patient states that when she has a day when she has to be on her feet a lot she is in a lot of pain the following day. The pain is unbearable.  Per CARLYN Griffin increase the Gabapentin 300mg to 2 capsules TID  Script sent to [facility]   20

## 2023-01-08 NOTE — ED PROVIDER NOTE - OBJECTIVE STATEMENT
77F former smoker (15py), sarcoidosis not on Rx, R carotid stent on asa81 (last dose this AM), htn (noncompliant x2w), hld, niddm (noncompliant for "months"), c/o 3w cold-like sx (cough/congestion/myalgias/malaise) upon arriving to [place] (2hr flight) to visit her kids (12/19/22) now returned yesterday PM w/ now <24h anxiety/sob and feeling of doom. currently reports no sob but feels very anxious. +scant amounts of loose diarrhea 3-4x per day usually after eating, none since being in ED. +decreased appetite. no fever/chills, no ha/dizziness, no neck pain/stiffness, no cp, no abd pain/n/v, no hematochezia/melena, no dysuria, no rash, no trauma, no etoh-dpt/ivdu, no recent hospitalizations.    cards: sp 77F former smoker (15py), sarcoidosis not on Rx, R carotid stent on asa81 (last dose this AM), htn (noncompliant x2w), hld, niddm (noncompliant for "months"), c/o 3w cold-like sx (cough/congestion/myalgias/malaise) upon arriving to [place] (2hr flight) to visit her kids (12/19/22) now returned yesterday PM w/ now <24h anxiety/sob, myalgias and feeling of "something bad is happening." currently reports no sob but feels very anxious. +scant amounts of loose brown stool 3-4x per day usually after eating, none since being in ED. +decreased appetite. no fever/chills, no ha/dizziness, no neck pain/stiffness, no cp, no abd pain/n/v, no hematochezia/melena, no dysuria, no rash, no trauma, no etoh-dpt/ivdu, no recent hospitalizations.    cards: sp

## 2023-01-08 NOTE — ED ADULT NURSE NOTE - OBJECTIVE STATEMENT
77y female presents to ED for multiple medical complaints. Pt states she has had chest pain, sob, cold feet, and red feet since 3pm today. Feet are warm to touch with bilateral intact pulses. Pt anxious appearing and uncooperative with assessment. States "I don't want to talk. I want you to help me." Speaking in full and complete sentences. EKG in progress. PMH of HTN, states she was on blood thinner but discontinued. A&Ox4.

## 2023-01-08 NOTE — ED PROVIDER NOTE - NSFOLLOWUPINSTRUCTIONS_ED_ALL_ED_FT
PLEASE REST AND REMAIN HYDRATED (EG, PEDIALYTE, GATORADE, ETC). TYLENOL AS NEEDED FOR FEVER (>100.4F).     PLEASE FOLLOW-UP WITH YOUR PCP IN 1-2 DAYS.    ANY IMAGING RESULTS GIVEN IN THE EMERGENCY DEPARTMENT ARE PRELIMINARY UNTIL FORMALLY READ BY A RADIOLOGIST. YOU WILL BE CONTACTED IF THERE ARE ANY SIGNIFICANT CHANGES IN THE FINAL READ.    PLEASE RETURN TO THE EMERGENCY DEPARTMENT IF GENERALIZED WEAKNESS/UNABLE TO AMBULATE, FEVER, CHEST PAIN, SHORTNESS OF BREATH, ABDOMINAL PAIN, VOMITING, OTHER CONCERNING SYMPTOMS.    PLEASE CONTACT HEBER DIEHL ([facility] EMERGENCY DEPARTMENT CLINICAL REFERRAL COORDINATOR) TO ASSIST IN SCHEDULING YOUR FOLLOW-UP APPOINTMENT.    Monday - Friday 11am-7pm  [phone]  [email or web address]

## 2023-01-08 NOTE — ED ADULT TRIAGE NOTE - OTHER COMPLAINTS
bilateral foot and knee pain described as "hot then cold" with decreased sensation. +DP equal and bilateral. pt anxious appearing.

## 2023-01-08 NOTE — ED PROVIDER NOTE - CLINICAL SUMMARY MEDICAL DECISION MAKING FREE TEXT BOX
hx obtained from pt, daughter/bettie, and chart review. avss. nontoxic. anxious otherwise NAD. no active cp. no acute resp distress. euvolemic. no leukocytosis vs significant anemia. found to have mild hypokalemia. s/p repletion. trop/bnp wnl. ekg nonischemic. found to have elevated ddimer. cta chest w/o acute pe vs pna vs ptx vs pulm edema. found to have influenza A. tachypnea/anxiety resolved s/p ativan/reassurance. steady baseline gait. pt w/ full capacity. offered but declining admission. states she wants to go home and her friend/coworker will stay w/ her. daughter/bettie updated and agrees w/ plan. questions answered. strict return precautions.

## 2023-01-08 NOTE — ED PROVIDER NOTE - PROGRESS NOTE DETAILS
tachypnea/anxiety resolved s/p ativan/reassurance. steady baseline gait. pt w/ full capacity. offered but declining admission. states she wants to go home and her friend/coworker will stay w/ her. bettie, daughter ([phone]), contacted per pt request. updated and agrees w/ plan. friend/coworker at bs. will stay w/ pt overnight per pt/family request.

## 2023-01-08 NOTE — ED PROVIDER NOTE - PATIENT PORTAL LINK FT
You can access the FollowMyHealth Patient Portal offered by [facility] by registering at the following website: [email or web address]. By joining [facility] FollowMyHealth portal, you will also be able to view your health information using other applications (apps) compatible with our system.

## 2023-01-10 ENCOUNTER — NON-APPOINTMENT (OUTPATIENT)
Age: 78
End: 2023-01-10

## 2023-01-10 DIAGNOSIS — Z87.891 PERSONAL HISTORY OF NICOTINE DEPENDENCE: ICD-10-CM

## 2023-01-10 DIAGNOSIS — Z79.82 LONG TERM (CURRENT) USE OF ASPIRIN: ICD-10-CM

## 2023-01-10 DIAGNOSIS — E87.6 HYPOKALEMIA: ICD-10-CM

## 2023-01-10 DIAGNOSIS — Z20.822 CONTACT WITH AND (SUSPECTED) EXPOSURE TO COVID-19: ICD-10-CM

## 2023-01-10 DIAGNOSIS — R05.9 COUGH, UNSPECIFIED: ICD-10-CM

## 2023-01-10 DIAGNOSIS — E78.5 HYPERLIPIDEMIA, UNSPECIFIED: ICD-10-CM

## 2023-01-10 DIAGNOSIS — Z79.02 LONG TERM (CURRENT) USE OF ANTITHROMBOTICS/ANTIPLATELETS: ICD-10-CM

## 2023-01-10 DIAGNOSIS — Z79.84 LONG TERM (CURRENT) USE OF ORAL HYPOGLYCEMIC DRUGS: ICD-10-CM

## 2023-01-10 DIAGNOSIS — R79.1 ABNORMAL COAGULATION PROFILE: ICD-10-CM

## 2023-01-10 DIAGNOSIS — E11.9 TYPE 2 DIABETES MELLITUS WITHOUT COMPLICATIONS: ICD-10-CM

## 2023-01-10 DIAGNOSIS — J10.1 INFLUENZA DUE TO OTHER IDENTIFIED INFLUENZA VIRUS WITH OTHER RESPIRATORY MANIFESTATIONS: ICD-10-CM

## 2023-01-10 DIAGNOSIS — Z95.5 PRESENCE OF CORONARY ANGIOPLASTY IMPLANT AND GRAFT: ICD-10-CM

## 2023-01-10 DIAGNOSIS — I10 ESSENTIAL (PRIMARY) HYPERTENSION: ICD-10-CM

## 2023-01-11 ENCOUNTER — APPOINTMENT (OUTPATIENT)
Dept: INTERNAL MEDICINE | Facility: CLINIC | Age: 78
End: 2023-01-11
Payer: COMMERCIAL

## 2023-01-11 ENCOUNTER — NON-APPOINTMENT (OUTPATIENT)
Age: 78
End: 2023-01-11

## 2023-01-11 ENCOUNTER — LABORATORY RESULT (OUTPATIENT)
Age: 78
End: 2023-01-11

## 2023-01-11 VITALS
DIASTOLIC BLOOD PRESSURE: 78 MMHG | HEIGHT: 66 IN | WEIGHT: 180 LBS | SYSTOLIC BLOOD PRESSURE: 147 MMHG | OXYGEN SATURATION: 99 % | RESPIRATION RATE: 18 BRPM | BODY MASS INDEX: 28.93 KG/M2 | TEMPERATURE: 98.1 F | HEART RATE: 90 BPM

## 2023-01-11 DIAGNOSIS — R35.0 FREQUENCY OF MICTURITION: ICD-10-CM

## 2023-01-11 DIAGNOSIS — I10 ESSENTIAL (PRIMARY) HYPERTENSION: ICD-10-CM

## 2023-01-11 DIAGNOSIS — E87.6 HYPOKALEMIA: ICD-10-CM

## 2023-01-11 PROCEDURE — 93000 ELECTROCARDIOGRAM COMPLETE: CPT

## 2023-01-11 PROCEDURE — 36415 COLL VENOUS BLD VENIPUNCTURE: CPT

## 2023-01-11 PROCEDURE — 99214 OFFICE O/P EST MOD 30 MIN: CPT | Mod: 25

## 2023-01-11 NOTE — HISTORY OF PRESENT ILLNESS
[Friend] : friend [FreeTextEntry1] : follow up after ED visit. [de-identified] : Pt is a 77 year old F with PMHX of CAD ( JOLIE to pLAD, EF 55% 03/12/21), HTN, HLD, preDM and sarcoidosis for follow up after ED visit.\par \par Flu: dx'ed at [facility] on 1/8/23\par Pt reports she was coughing, and having a burning/ cold sensation in b/l feet one week prior to ED visit. States that Saturday was having chills, diarrhea, urinary frequency, SOB and chest heaviness.  Sunday went to ED and EKG was unchanged and negative trops, clear chest Xray. d/c'ed on Tamiflu\par \par Today, pt still states she feels very anxious c/o chest heaviness at rest. Woke up with it states it waxes and wanes. Reports severity 6-7/10.  States that she suffers from anxiety and she has had similar symptoms in the past that are improved with deep breathing exercises. States deep breathing exercises today have improved the symptoms but it is still present. Pt endorses constipation.  She stopped taking all her home medication. Only started tamiflu yesterday.\par \par Denies night sweats, fever, LE edema\par \par In ED visit:\par K : 3.1 \par Na: 131

## 2023-01-11 NOTE — PHYSICAL EXAM
[Normal Sclera/Conjunctiva] : normal sclera/conjunctiva [Normal Outer Ear/Nose] : the outer ears and nose were normal in appearance [No Edema] : there was no peripheral edema [Normal] : no posterior cervical lymphadenopathy and no anterior cervical lymphadenopathy [No Rash] : no rash [de-identified] : tachypneic, anxious appearing [de-identified] : negative whisper pectoriloquy and egophony [de-identified] :  reproducible mid sternal chest tenderness, no friction rub  [de-identified] : anxious appearing

## 2023-01-12 LAB
ALBUMIN SERPL ELPH-MCNC: 4.2 G/DL
ALP BLD-CCNC: 61 U/L
ALT SERPL-CCNC: 9 U/L
ANION GAP SERPL CALC-SCNC: 15 MMOL/L
APPEARANCE: CLEAR
AST SERPL-CCNC: 18 U/L
BASOPHILS # BLD AUTO: 0.03 K/UL
BASOPHILS NFR BLD AUTO: 0.6 %
BILIRUB SERPL-MCNC: 0.5 MG/DL
BILIRUBIN URINE: NEGATIVE
BLOOD URINE: NEGATIVE
BUN SERPL-MCNC: 8 MG/DL
CALCIUM SERPL-MCNC: 9.5 MG/DL
CHLORIDE SERPL-SCNC: 102 MMOL/L
CO2 SERPL-SCNC: 23 MMOL/L
COLOR: YELLOW
CREAT SERPL-MCNC: 0.8 MG/DL
EGFR: 75 ML/MIN/1.73M2
EOSINOPHIL # BLD AUTO: 0.03 K/UL
EOSINOPHIL NFR BLD AUTO: 0.6 %
GLUCOSE QUALITATIVE U: NEGATIVE
GLUCOSE SERPL-MCNC: 120 MG/DL
HCT VFR BLD CALC: 39.1 %
HGB BLD-MCNC: 12.6 G/DL
IMM GRANULOCYTES NFR BLD AUTO: 0.2 %
KETONES URINE: NEGATIVE
LEUKOCYTE ESTERASE URINE: ABNORMAL
LYMPHOCYTES # BLD AUTO: 0.93 K/UL
LYMPHOCYTES NFR BLD AUTO: 19.6 %
MAN DIFF?: NORMAL
MCHC RBC-ENTMCNC: 32.2 GM/DL
MCHC RBC-ENTMCNC: 32.6 PG
MCV RBC AUTO: 101 FL
MONOCYTES # BLD AUTO: 0.28 K/UL
MONOCYTES NFR BLD AUTO: 5.9 %
NEUTROPHILS # BLD AUTO: 3.46 K/UL
NEUTROPHILS NFR BLD AUTO: 73.1 %
NITRITE URINE: NEGATIVE
PH URINE: 7.5
PLATELET # BLD AUTO: 229 K/UL
POTASSIUM SERPL-SCNC: 3.9 MMOL/L
PROT SERPL-MCNC: 7 G/DL
PROTEIN URINE: NORMAL
RBC # BLD: 3.87 M/UL
RBC # FLD: 13.2 %
SODIUM SERPL-SCNC: 140 MMOL/L
SPECIFIC GRAVITY URINE: 1.01
UROBILINOGEN URINE: NORMAL
WBC # FLD AUTO: 4.74 K/UL

## 2023-01-13 LAB — BACTERIA UR CULT: NORMAL

## 2023-01-26 ENCOUNTER — RX RENEWAL (OUTPATIENT)
Age: 78
End: 2023-01-26

## 2023-02-02 ENCOUNTER — RX RENEWAL (OUTPATIENT)
Age: 78
End: 2023-02-02

## 2023-02-13 NOTE — ED ADULT TRIAGE NOTE - ESI TRIAGE ACUITY LEVEL, MLM
2 Hydroxychloroquine Pregnancy And Lactation Text: This medication has been shown to cause fetal harm but it isn't assigned a Pregnancy Risk Category. There are small amounts excreted in breast milk.

## 2023-03-08 ENCOUNTER — APPOINTMENT (OUTPATIENT)
Dept: HEART AND VASCULAR | Facility: CLINIC | Age: 78
End: 2023-03-08
Payer: COMMERCIAL

## 2023-03-08 VITALS
WEIGHT: 183 LBS | SYSTOLIC BLOOD PRESSURE: 143 MMHG | BODY MASS INDEX: 29.41 KG/M2 | TEMPERATURE: 97.2 F | HEIGHT: 66 IN | HEART RATE: 81 BPM | DIASTOLIC BLOOD PRESSURE: 72 MMHG | OXYGEN SATURATION: 96 %

## 2023-03-08 DIAGNOSIS — I25.10 ATHEROSCLEROTIC HEART DISEASE OF NATIVE CORONARY ARTERY W/OUT ANGINA PECTORIS: ICD-10-CM

## 2023-03-08 DIAGNOSIS — I42.9 CARDIOMYOPATHY, UNSPECIFIED: ICD-10-CM

## 2023-03-08 DIAGNOSIS — L98.9 DISORDER OF THE SKIN AND SUBCUTANEOUS TISSUE, UNSPECIFIED: ICD-10-CM

## 2023-03-08 PROCEDURE — 93000 ELECTROCARDIOGRAM COMPLETE: CPT

## 2023-03-08 PROCEDURE — 93923 UPR/LXTR ART STDY 3+ LVLS: CPT

## 2023-03-08 PROCEDURE — 99215 OFFICE O/P EST HI 40 MIN: CPT | Mod: 25

## 2023-03-10 ENCOUNTER — NON-APPOINTMENT (OUTPATIENT)
Age: 78
End: 2023-03-10

## 2023-03-10 PROBLEM — I42.9 CARDIOMYOPATHY: Status: ACTIVE | Noted: 2021-02-10

## 2023-03-10 PROBLEM — L98.9 LEG SORE: Status: ACTIVE | Noted: 2022-03-23

## 2023-03-10 PROBLEM — I25.10 CAD IN NATIVE ARTERY: Status: ACTIVE | Noted: 2021-03-22

## 2023-03-10 RX ORDER — METFORMIN HYDROCHLORIDE 500 MG/1
500 TABLET, COATED ORAL DAILY
Qty: 90 | Refills: 3 | Status: ACTIVE | COMMUNITY
Start: 2017-05-24 | End: 1900-01-01

## 2023-03-10 NOTE — REVIEW OF SYSTEMS
[Dyspnea on exertion] : dyspnea during exertion [Chest Discomfort] : chest discomfort [Leg Claudication] : intermittent leg claudication [Fever] : no fever [Chills] : no chills [SOB] : no shortness of breath [Lower Ext Edema] : no extremity edema [Palpitations] : no palpitations [Orthopnea] : no orthopnea [PND] : no PND [Syncope] : no syncope [Nausea] : no nausea [Vomiting] : no vomiting [Diarrhea] : diarrhea [Dizziness] : no dizziness [Numbness (Hypoesthesia)] : no numbness [Weakness] : no weakness [Speech Disturbance] : no speech disturbance [Easy Bleeding] : no tendency for easy bleeding [de-identified] : unsteadiness

## 2023-03-10 NOTE — HISTORY OF PRESENT ILLNESS
[FreeTextEntry1] : 77 year old female with PMHX of CAD ( 03/12/21), HTN, HLD, preDM and sarcoidosis here for follow up and ROBERTO. \par \par She occasionally notices chest heaviness during rest. This is long standing. It has happened about 3 times since last visit. She had a few episodes in January. Patient was not feeling well, having URI type symptoms with chest heaviness. She thought it was anxiety. She was evaluated at [facility], trop negative but positive for Influenza.  Last episode this Saturday, at home, while resting. It can last up to 30 minutes. No associated symptoms. \par \par She does remains active. She can walk [address] and back. She walks so slow. She has no return of the chest heaviness. \par \par She still has some leg pains on exertion but more over shin and left hip. She continues to walk with a cane. \par \par She still feels unbalanced with standing too long or moving too fast. She has not fallen. No episodes of syncope, edema or neuro focal deficits. She reports no palpitations. She has a history of vertigo. She has been referred to an ENT but has not gone yet.\par \par Recent Work up\par Holter ( 08/11/2022 ): 1 day, no significant arrhythmia\par Carotid ( 08/03/2022 ): mod - severe plaque of proximal right and left internal carotids cw 16-49% stenosis\par ECHO ( 05/02/2022 ): mild MR, mild AR, EF 55, normal wall motion\par Cath ( 3/12/21 ):  JOLIE to pLAD, EF 55%

## 2023-03-10 NOTE — PHYSICAL EXAM
[Well Developed] : well developed [Well Nourished] : well nourished [No Carotid Bruit] : no carotid bruit [Normal S1, S2] : normal S1, S2 [No Murmur] : no murmur [Clear Lung Fields] : clear lung fields [Good Air Entry] : good air entry [No Respiratory Distress] : no respiratory distress  [No Edema] : no edema [Moves all extremities] : moves all extremities [No Focal Deficits] : no focal deficits [Normal Speech] : normal speech [Alert and Oriented] : alert and oriented [Normal memory] : normal memory

## 2023-03-10 NOTE — DISCUSSION/SUMMARY
[FreeTextEntry1] : 77 year old female with PMHX of CAD ( JOLIE to pLAD, EF 55% 03/12/21), HTN, HLD, preDM and sarcoidosis here for follow up.\par \par CAD: atypical chest heaviness. Given difficulty walking, will send for Pharm NST to evaluate for CAD. \par HTN: Goal < 130/90. Controlled. Continue with HCTZ 25mg QD\par HLD:  LDL Goal < 70. Controlled at 54. Continue with Rosuvastatin 40mg\par Leg Pain: Long standing pain with exertion with decreased pedal pulses. ROBERTO today negative. PVD not likely. \par Dizziness:  Previous Holter report reviewed. Not likely cardiac. Follow up with PCP /ENT\par \par Send for NST. Return in 3 months\par \par \par I, Dr. Ofelia Morris personally performed the evaluation and management services for this patient who presents today with a changed problem.  The evaluation and management includes conducting the examination assessing all conditions and establishing a new plan of care.  Today my ACP Abi Leyva was here and recorded the evaluation and management services.                                                                        \par \par Patient has recurrent chest heaviness.  It is possible ischemia.  Plan as above.\par \par  [EKG obtained to assist in diagnosis and management of assessed problem(s)] : EKG obtained to assist in diagnosis and management of assessed problem(s)

## 2023-04-11 ENCOUNTER — RX RENEWAL (OUTPATIENT)
Age: 78
End: 2023-04-11

## 2023-06-14 ENCOUNTER — RESULT CHARGE (OUTPATIENT)
Age: 78
End: 2023-06-14

## 2023-06-15 ENCOUNTER — APPOINTMENT (OUTPATIENT)
Dept: INTERNAL MEDICINE | Facility: CLINIC | Age: 78
End: 2023-06-15
Payer: COMMERCIAL

## 2023-06-15 ENCOUNTER — NON-APPOINTMENT (OUTPATIENT)
Age: 78
End: 2023-06-15

## 2023-06-15 VITALS
HEART RATE: 64 BPM | TEMPERATURE: 97.9 F | OXYGEN SATURATION: 97 % | WEIGHT: 183 LBS | BODY MASS INDEX: 29.41 KG/M2 | HEIGHT: 66 IN | DIASTOLIC BLOOD PRESSURE: 78 MMHG | SYSTOLIC BLOOD PRESSURE: 138 MMHG

## 2023-06-15 DIAGNOSIS — G62.9 POLYNEUROPATHY, UNSPECIFIED: ICD-10-CM

## 2023-06-15 DIAGNOSIS — L28.0 LICHEN SIMPLEX CHRONICUS: ICD-10-CM

## 2023-06-15 DIAGNOSIS — R42 DIZZINESS AND GIDDINESS: ICD-10-CM

## 2023-06-15 DIAGNOSIS — J10.1 INFLUENZA DUE TO OTHER IDENTIFIED INFLUENZA VIRUS WITH OTHER RESPIRATORY MANIFESTATIONS: ICD-10-CM

## 2023-06-15 DIAGNOSIS — R07.89 OTHER CHEST PAIN: ICD-10-CM

## 2023-06-15 PROCEDURE — 99213 OFFICE O/P EST LOW 20 MIN: CPT | Mod: 25

## 2023-06-15 PROCEDURE — 36415 COLL VENOUS BLD VENIPUNCTURE: CPT

## 2023-06-15 PROCEDURE — 93000 ELECTROCARDIOGRAM COMPLETE: CPT

## 2023-06-15 RX ORDER — HYDROCORTISONE VALERATE 2 MG/G
0.2 OINTMENT TOPICAL 3 TIMES DAILY
Qty: 1 | Refills: 0 | Status: ACTIVE | COMMUNITY
Start: 2023-06-15 | End: 1900-01-01

## 2023-06-15 RX ORDER — BENZONATATE 100 MG/1
100 CAPSULE ORAL 3 TIMES DAILY
Qty: 30 | Refills: 1 | Status: COMPLETED | COMMUNITY
Start: 2023-01-11 | End: 2023-06-15

## 2023-06-15 NOTE — HISTORY OF PRESENT ILLNESS
[FreeTextEntry1] : f/u of "lower back itchiness" and "legs feeling hot" [de-identified] : Pt is a 79 y/o F who presents to the office today for f/u of l"ower back itchiness" and "legs feeling hot"\par \par [place]: \par - will be moving in <1 mo\par \par lower back pruritus\par - states has been going on since July 2022\par - pruritus over L glute - will occasionally get a rash\par - no pain\par \par "Hot legs b/l":\par - states legs are just hot- when questioned further she states" burning"\par - no edema, erythema,\par \par Dizziness:\par - states if she is talking fast she will get lightheaded- no CP, SOB, palpitations\par - denies vertiginous sensation

## 2023-06-15 NOTE — END OF VISIT
[FreeTextEntry3] : I, Dr. Bennett, personally performed the evaluation and management (E/M) services for this established patient who presents today with (a) new problem(s)/exacerbation of (an) existing condition(s).  That E/M includes conducting the examination, assessing all new/exacerbated conditions, and establishing a new plan of care.  Today, my PA, Lisa Jolley, was here to observe my evaluation and management services for this new problem/exacerbated condition to be followed going forward.

## 2023-06-15 NOTE — PHYSICAL EXAM
[No Acute Distress] : no acute distress [No Edema] : there was no peripheral edema [Normal] : affect was normal and insight and judgment were intact [de-identified] : + hyperpigmented lichenified rash over R glute

## 2023-06-19 DIAGNOSIS — E53.8 DEFICIENCY OF OTHER SPECIFIED B GROUP VITAMINS: ICD-10-CM

## 2023-06-19 LAB
ALBUMIN SERPL ELPH-MCNC: 4.4 G/DL
ALP BLD-CCNC: 66 U/L
ALT SERPL-CCNC: 11 U/L
ANION GAP SERPL CALC-SCNC: 17 MMOL/L
AST SERPL-CCNC: 23 U/L
BILIRUB SERPL-MCNC: 0.3 MG/DL
BUN SERPL-MCNC: 13 MG/DL
CALCIUM SERPL-MCNC: 9.7 MG/DL
CHLORIDE SERPL-SCNC: 99 MMOL/L
CO2 SERPL-SCNC: 26 MMOL/L
CREAT SERPL-MCNC: 0.78 MG/DL
EGFR: 77 ML/MIN/1.73M2
ESTIMATED AVERAGE GLUCOSE: 131 MG/DL
FERRITIN SERPL-MCNC: 108 NG/ML
FOLATE SERPL-MCNC: 6.8 NG/ML
GLUCOSE SERPL-MCNC: 106 MG/DL
HBA1C MFR BLD HPLC: 6.2 %
IRON SATN MFR SERPL: 27 %
IRON SERPL-MCNC: 92 UG/DL
POTASSIUM SERPL-SCNC: 4 MMOL/L
PROT SERPL-MCNC: 6.8 G/DL
SODIUM SERPL-SCNC: 141 MMOL/L
TIBC SERPL-MCNC: 345 UG/DL
UIBC SERPL-MCNC: 253 UG/DL
VIT B12 SERPL-MCNC: 289 PG/ML

## 2023-06-19 RX ORDER — CALCIUM CARBONATE 300MG(750)
1000 TABLET,CHEWABLE ORAL DAILY
Qty: 1 | Refills: 2 | Status: ACTIVE | COMMUNITY
Start: 2023-06-19 | End: 1900-01-01

## 2023-07-05 ENCOUNTER — RX RENEWAL (OUTPATIENT)
Age: 78
End: 2023-07-05

## 2023-07-05 RX ORDER — ERGOCALCIFEROL 1.25 MG/1
1.25 MG CAPSULE, LIQUID FILLED ORAL
Qty: 12 | Refills: 0 | Status: ACTIVE | COMMUNITY
Start: 2018-11-28 | End: 1900-01-01

## 2023-07-06 RX ORDER — ROSUVASTATIN CALCIUM 20 MG/1
20 TABLET, FILM COATED ORAL
Qty: 135 | Refills: 0 | Status: ACTIVE | COMMUNITY
Start: 2021-03-05 | End: 1900-01-01

## 2024-01-08 ENCOUNTER — RX RENEWAL (OUTPATIENT)
Age: 79
End: 2024-01-08

## 2024-01-08 RX ORDER — HYDROCHLOROTHIAZIDE 25 MG/1
25 TABLET ORAL
Qty: 90 | Refills: 3 | Status: ACTIVE | COMMUNITY
Start: 2017-05-08 | End: 1900-01-01

## 2024-02-28 ENCOUNTER — NEW PATIENT (OUTPATIENT)
Facility: LOCATION | Age: 79
End: 2024-02-28

## 2024-02-28 DIAGNOSIS — E11.9: ICD-10-CM

## 2024-02-28 DIAGNOSIS — R42: ICD-10-CM

## 2024-02-28 DIAGNOSIS — D86.9: ICD-10-CM

## 2024-02-28 DIAGNOSIS — H25.12: ICD-10-CM

## 2024-02-28 PROCEDURE — 92004 COMPRE OPH EXAM NEW PT 1/>: CPT

## 2024-02-28 PROCEDURE — 92015 DETERMINE REFRACTIVE STATE: CPT

## 2024-02-28 ASSESSMENT — TONOMETRY
OD_IOP_MMHG: 10
OS_IOP_MMHG: 11

## 2024-02-28 ASSESSMENT — VISUAL ACUITY
OD_SC: 20/60
OU_SC: 20/50
OS_SC: 20/70

## 2024-02-28 ASSESSMENT — KERATOMETRY
OD_K2POWER_DIOPTERS: 42.25
OD_AXISANGLE_DEGREES: 093
OS_K1POWER_DIOPTERS: 42.25
OD_AXISANGLE2_DEGREES: 3
OS_AXISANGLE2_DEGREES: 90
OS_AXISANGLE_DEGREES: 180
OD_K1POWER_DIOPTERS: 42.00
OS_K2POWER_DIOPTERS: 42.50
